# Patient Record
Sex: FEMALE | Race: WHITE
[De-identification: names, ages, dates, MRNs, and addresses within clinical notes are randomized per-mention and may not be internally consistent; named-entity substitution may affect disease eponyms.]

---

## 2017-03-21 ENCOUNTER — HOSPITAL ENCOUNTER (OUTPATIENT)
Dept: HOSPITAL 83 - MAMMO | Age: 72
Discharge: HOME | End: 2017-03-21
Attending: INTERNAL MEDICINE
Payer: MEDICARE

## 2017-03-21 DIAGNOSIS — Z85.3: ICD-10-CM

## 2017-03-21 DIAGNOSIS — D05.10: Primary | ICD-10-CM

## 2017-08-07 ENCOUNTER — HOSPITAL ENCOUNTER (OUTPATIENT)
Dept: HOSPITAL 83 - CT | Age: 72
Discharge: HOME | End: 2017-08-07
Attending: INTERNAL MEDICINE
Payer: MEDICARE

## 2017-08-07 DIAGNOSIS — M47.896: ICD-10-CM

## 2017-08-07 DIAGNOSIS — Z85.3: ICD-10-CM

## 2017-08-07 DIAGNOSIS — K44.9: Primary | ICD-10-CM

## 2017-08-07 DIAGNOSIS — Z96.642: ICD-10-CM

## 2017-08-07 DIAGNOSIS — M12.851: ICD-10-CM

## 2017-08-07 DIAGNOSIS — Z90.49: ICD-10-CM

## 2017-08-07 DIAGNOSIS — M43.16: ICD-10-CM

## 2017-09-08 ENCOUNTER — HOSPITAL ENCOUNTER (OUTPATIENT)
Dept: HOSPITAL 83 - SDC | Age: 72
Discharge: HOME | End: 2017-09-08
Attending: INTERNAL MEDICINE
Payer: MEDICARE

## 2017-09-08 VITALS — HEIGHT: 60 IN | WEIGHT: 184 LBS | BODY MASS INDEX: 36.12 KG/M2

## 2017-09-08 VITALS — DIASTOLIC BLOOD PRESSURE: 79 MMHG

## 2017-09-08 VITALS — SYSTOLIC BLOOD PRESSURE: 136 MMHG | DIASTOLIC BLOOD PRESSURE: 69 MMHG

## 2017-09-08 VITALS — SYSTOLIC BLOOD PRESSURE: 153 MMHG | DIASTOLIC BLOOD PRESSURE: 72 MMHG

## 2017-09-08 VITALS — DIASTOLIC BLOOD PRESSURE: 61 MMHG

## 2017-09-08 DIAGNOSIS — Z80.9: ICD-10-CM

## 2017-09-08 DIAGNOSIS — Z88.8: ICD-10-CM

## 2017-09-08 DIAGNOSIS — E66.9: ICD-10-CM

## 2017-09-08 DIAGNOSIS — J43.9: ICD-10-CM

## 2017-09-08 DIAGNOSIS — K44.9: ICD-10-CM

## 2017-09-08 DIAGNOSIS — I10: ICD-10-CM

## 2017-09-08 DIAGNOSIS — J45.909: ICD-10-CM

## 2017-09-08 DIAGNOSIS — F31.9: ICD-10-CM

## 2017-09-08 DIAGNOSIS — E11.9: ICD-10-CM

## 2017-09-08 DIAGNOSIS — Z88.0: ICD-10-CM

## 2017-09-08 DIAGNOSIS — K29.50: Primary | ICD-10-CM

## 2017-09-08 DIAGNOSIS — Z79.4: ICD-10-CM

## 2017-09-08 DIAGNOSIS — Z85.3: ICD-10-CM

## 2017-09-08 DIAGNOSIS — K62.1: ICD-10-CM

## 2017-09-08 DIAGNOSIS — K57.30: ICD-10-CM

## 2017-09-08 DIAGNOSIS — Z83.3: ICD-10-CM

## 2017-09-08 DIAGNOSIS — F32.9: ICD-10-CM

## 2017-09-08 DIAGNOSIS — Z90.11: ICD-10-CM

## 2017-09-08 DIAGNOSIS — Z88.2: ICD-10-CM

## 2017-11-16 ENCOUNTER — HOSPITAL ENCOUNTER (OUTPATIENT)
Dept: HOSPITAL 83 - SDC | Age: 72
Discharge: HOME | End: 2017-11-16
Attending: SURGERY
Payer: MEDICARE

## 2017-11-16 VITALS — BODY MASS INDEX: 35.14 KG/M2 | HEIGHT: 60 IN | WEIGHT: 179 LBS

## 2017-11-16 VITALS — DIASTOLIC BLOOD PRESSURE: 56 MMHG | SYSTOLIC BLOOD PRESSURE: 164 MMHG

## 2017-11-16 VITALS — DIASTOLIC BLOOD PRESSURE: 86 MMHG | SYSTOLIC BLOOD PRESSURE: 168 MMHG

## 2017-11-16 VITALS — DIASTOLIC BLOOD PRESSURE: 93 MMHG

## 2017-11-16 VITALS — DIASTOLIC BLOOD PRESSURE: 90 MMHG | SYSTOLIC BLOOD PRESSURE: 183 MMHG

## 2017-11-16 DIAGNOSIS — Z83.3: ICD-10-CM

## 2017-11-16 DIAGNOSIS — Z80.9: ICD-10-CM

## 2017-11-16 DIAGNOSIS — Z88.1: ICD-10-CM

## 2017-11-16 DIAGNOSIS — F41.9: ICD-10-CM

## 2017-11-16 DIAGNOSIS — J43.9: ICD-10-CM

## 2017-11-16 DIAGNOSIS — Z96.653: ICD-10-CM

## 2017-11-16 DIAGNOSIS — L72.0: Primary | ICD-10-CM

## 2017-11-16 DIAGNOSIS — F31.9: ICD-10-CM

## 2017-11-16 DIAGNOSIS — I10: ICD-10-CM

## 2017-11-16 DIAGNOSIS — E11.9: ICD-10-CM

## 2017-11-16 DIAGNOSIS — Z88.0: ICD-10-CM

## 2017-11-16 NOTE — PROC NOTE
[place], [place]
 
                                     PROCEDURE NOTE
 
        NAME: VIVIANE CRABTREE                    [facility] #: [id number]
        UNIT #: [id number]                        ROOM:           
        DOCTOR: CARL MORENO MD                  BIRTHDATE: [birth year]
 
 
DOS: 11/16/2017
 
PREOPERATIVE DIAGNOSIS:  Left thigh soft tissue mass.
 
POSTOPERATIVE DIAGNOSIS:  Left thigh soft tissue mass.
 
PROCEDURE:  Excision of left soft tissue mass.
 
SURGEON:  Carl Moreno MD
 
ASSISTANT:  JAZZ.
 
ANESTHESIA:  Local.
 
INDICATIONS:  This is a 72-year-old  lady with a history of
long-standing left thigh soft tissue mass, who is here for the above-mentioned
procedure.  The procedure and its complications were explained to the patient in
detail.  Complications that were discussed included but were not limited to
bleeding, infection, hematoma/seroma/abscess formation and prolonged pain.  She
agreed to proceed.
 
DESCRIPTION OF PROCEDURE:  After identifying the patient, the patient was
brought to the operating suite and laid in the supine position.  After the parts
were painted and draped in the usual sterile fashion, a timeout procedure was
called and an incision was marked.  Local anesthesia (1% plain lidocaine) was
injected in this marked site.  Skin incision was made.  The mass was identified
and was dissected away from the surrounding structures with the help of
electrocautery and blunt dissection and sent for histopathological diagnosis
after it was completely excised.  Hemostasis was achieved with the help of
electrocautery.  Thereafter, the subcutaneous tissue was approximated in
interrupted fashion with the help of 3-0 Vicryl and the skin edges were
approximated with help of 4-0 Vicryl in a subcuticular running fashion. 
Dressing was placed.  The patient tolerated the procedure well.  There were no
complications.  Dr. Carl Moreno, the attending surgeon, was present throughout
the operating case.
 
 
 
_________________________________
Carl Moreno MD
 
CM:PROCNOTE:PROCEDURE NOTE
 
D: 11/16/17 0903
T: 11/16/17 0942
    
                                                            
CARL MORENO MD

## 2018-01-25 ENCOUNTER — HOSPITAL ENCOUNTER (OUTPATIENT)
Dept: HOSPITAL 83 - WOUNDCARE | Age: 73
Discharge: HOME | End: 2018-01-25
Payer: MEDICARE

## 2018-01-25 DIAGNOSIS — Z79.4: ICD-10-CM

## 2018-01-25 DIAGNOSIS — F41.9: ICD-10-CM

## 2018-01-25 DIAGNOSIS — E11.9: ICD-10-CM

## 2018-01-25 DIAGNOSIS — T81.89XA: Primary | ICD-10-CM

## 2018-01-25 DIAGNOSIS — Z85.3: ICD-10-CM
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
## 2018-01-25 DIAGNOSIS — I10: ICD-10-CM

## 2018-01-25 DIAGNOSIS — F31.9: ICD-10-CM

## 2018-05-16 ENCOUNTER — HOSPITAL ENCOUNTER (INPATIENT)
Dept: HOSPITAL 83 - ED | Age: 73
LOS: 5 days | Discharge: NURSING, REHAB OR LONG-TERM CARE | DRG: 190 | End: 2018-05-21
Attending: INTERNAL MEDICINE | Admitting: INTERNAL MEDICINE
Payer: MEDICARE

## 2018-05-16 VITALS — BODY MASS INDEX: 41.02 KG/M2 | HEIGHT: 57 IN | WEIGHT: 190.13 LBS

## 2018-05-16 VITALS — SYSTOLIC BLOOD PRESSURE: 142 MMHG | DIASTOLIC BLOOD PRESSURE: 49 MMHG

## 2018-05-16 VITALS — DIASTOLIC BLOOD PRESSURE: 82 MMHG | SYSTOLIC BLOOD PRESSURE: 168 MMHG

## 2018-05-16 VITALS — DIASTOLIC BLOOD PRESSURE: 85 MMHG

## 2018-05-16 VITALS — SYSTOLIC BLOOD PRESSURE: 154 MMHG | DIASTOLIC BLOOD PRESSURE: 47 MMHG

## 2018-05-16 VITALS — SYSTOLIC BLOOD PRESSURE: 172 MMHG | DIASTOLIC BLOOD PRESSURE: 72 MMHG

## 2018-05-16 VITALS — SYSTOLIC BLOOD PRESSURE: 152 MMHG | DIASTOLIC BLOOD PRESSURE: 73 MMHG

## 2018-05-16 VITALS — SYSTOLIC BLOOD PRESSURE: 140 MMHG | DIASTOLIC BLOOD PRESSURE: 53 MMHG

## 2018-05-16 DIAGNOSIS — J18.1: ICD-10-CM

## 2018-05-16 DIAGNOSIS — F41.1: ICD-10-CM

## 2018-05-16 DIAGNOSIS — J44.0: Primary | ICD-10-CM

## 2018-05-16 DIAGNOSIS — J44.1: ICD-10-CM

## 2018-05-16 DIAGNOSIS — Z79.4: ICD-10-CM

## 2018-05-16 DIAGNOSIS — Z90.11: ICD-10-CM

## 2018-05-16 DIAGNOSIS — Z80.9: ICD-10-CM

## 2018-05-16 DIAGNOSIS — Z98.41: ICD-10-CM

## 2018-05-16 DIAGNOSIS — R51: ICD-10-CM

## 2018-05-16 DIAGNOSIS — Z90.49: ICD-10-CM

## 2018-05-16 DIAGNOSIS — Z88.2: ICD-10-CM

## 2018-05-16 DIAGNOSIS — Z83.3: ICD-10-CM

## 2018-05-16 DIAGNOSIS — Z91.041: ICD-10-CM

## 2018-05-16 DIAGNOSIS — E11.22: ICD-10-CM

## 2018-05-16 DIAGNOSIS — J30.9: ICD-10-CM

## 2018-05-16 DIAGNOSIS — Z79.899: ICD-10-CM

## 2018-05-16 DIAGNOSIS — Z96.1: ICD-10-CM

## 2018-05-16 DIAGNOSIS — I12.9: ICD-10-CM

## 2018-05-16 DIAGNOSIS — N18.9: ICD-10-CM

## 2018-05-16 DIAGNOSIS — M48.00: ICD-10-CM

## 2018-05-16 DIAGNOSIS — D72.829: ICD-10-CM

## 2018-05-16 DIAGNOSIS — Z88.0: ICD-10-CM

## 2018-05-16 DIAGNOSIS — F31.9: ICD-10-CM

## 2018-05-16 DIAGNOSIS — Z87.19: ICD-10-CM

## 2018-05-16 DIAGNOSIS — Z81.8: ICD-10-CM

## 2018-05-16 DIAGNOSIS — E66.9: ICD-10-CM

## 2018-05-16 DIAGNOSIS — J90: ICD-10-CM

## 2018-05-16 DIAGNOSIS — Z90.710: ICD-10-CM

## 2018-05-16 DIAGNOSIS — Z98.42: ICD-10-CM

## 2018-05-16 DIAGNOSIS — Z96.653: ICD-10-CM

## 2018-05-16 DIAGNOSIS — Z84.89: ICD-10-CM

## 2018-05-16 LAB
ALBUMIN SERPL-MCNC: 2.2 GM/DL (ref 3.1–4.5)
ALP SERPL-CCNC: 53 U/L (ref 45–117)
ALT SERPL W P-5'-P-CCNC: 11 U/L (ref 12–78)
APPEARANCE UR: CLEAR
APTT PPP: 29.2 SECONDS (ref 20.8–31.5)
AST SERPL-CCNC: 8 IU/L (ref 3–35)
BACTERIA #/AREA URNS HPF: (no result) /[HPF]
BILIRUB UR QL STRIP: NEGATIVE
BUN SERPL-MCNC: 26 MG/DL (ref 7–24)
CHLORIDE SERPL-SCNC: 102 MMOL/L (ref 98–107)
COLOR UR: YELLOW
CREAT SERPL-MCNC: 1.13 MG/DL (ref 0.55–1.02)
EPI CELLS #/AREA URNS HPF: (no result) /[HPF]
ERYTHROCYTE [DISTWIDTH] IN BLOOD BY AUTOMATED COUNT: 12.2 % (ref 0–14.5)
GLUCOSE UR QL: NEGATIVE
HCT VFR BLD AUTO: 33.4 % (ref 37–47)
HGB BLD-MCNC: 11 G/DL (ref 12–16)
HGB UR QL STRIP: (no result)
INR BLD: 1 (ref 2–3.5)
KETONES UR QL STRIP: NEGATIVE
LEUKOCYTE ESTERASE UR QL STRIP: NEGATIVE
MCH RBC QN AUTO: 31.3 PG (ref 27–31)
MCHC RBC AUTO-ENTMCNC: 32.9 G/DL (ref 33–37)
MCV RBC AUTO: 95.2 FL (ref 81–99)
NITRITE UR QL STRIP: NEGATIVE
NRBC BLD QL AUTO: 0 10*3/UL (ref 0–0)
PH UR STRIP: 6.5 [PH] (ref 5–9)
PLATELET # BLD AUTO: 149 10*3/UL (ref 130–400)
PLATELET SUFFICIENCY: NORMAL
PMV BLD AUTO: 10.1 FL (ref 9.6–12.3)
POTASSIUM SERPL-SCNC: 3.7 MMOL/L (ref 3.5–5.1)
PROT SERPL-MCNC: 6.2 GM/DL (ref 6.4–8.2)
RBC # BLD AUTO: 3.51 10*6/UL (ref 4.1–5.1)
RBC #/AREA URNS HPF: (no result) RBC/HPF (ref 0–2)
RBC MORPH BLD: NORMAL
SODIUM SERPL-SCNC: 136 MMOL/L (ref 136–145)
SP GR UR: <= 1.005 (ref 1–1.03)
TOTAL CELLS COUNTED: 100 #CELLS
TROPONIN I SERPL-MCNC: < 0.015 NG/ML (ref ?–0.04)
UROBILINOGEN UR STRIP-MCNC: 0.2 E.U./DL (ref 0.2–1)
WBC #/AREA URNS HPF: (no result) WBC/HPF (ref 0–5)
WBC NRBC COR # BLD AUTO: 19 10*3/UL (ref 4.8–10.8)

## 2018-05-17 VITALS — SYSTOLIC BLOOD PRESSURE: 136 MMHG | DIASTOLIC BLOOD PRESSURE: 62 MMHG

## 2018-05-17 VITALS — SYSTOLIC BLOOD PRESSURE: 147 MMHG | DIASTOLIC BLOOD PRESSURE: 50 MMHG

## 2018-05-17 VITALS — SYSTOLIC BLOOD PRESSURE: 152 MMHG | DIASTOLIC BLOOD PRESSURE: 59 MMHG

## 2018-05-17 VITALS — DIASTOLIC BLOOD PRESSURE: 64 MMHG | SYSTOLIC BLOOD PRESSURE: 150 MMHG

## 2018-05-17 VITALS — SYSTOLIC BLOOD PRESSURE: 128 MMHG | DIASTOLIC BLOOD PRESSURE: 64 MMHG

## 2018-05-18 VITALS — DIASTOLIC BLOOD PRESSURE: 68 MMHG

## 2018-05-18 VITALS — DIASTOLIC BLOOD PRESSURE: 64 MMHG | SYSTOLIC BLOOD PRESSURE: 150 MMHG

## 2018-05-18 VITALS — DIASTOLIC BLOOD PRESSURE: 66 MMHG | SYSTOLIC BLOOD PRESSURE: 148 MMHG

## 2018-05-18 VITALS — DIASTOLIC BLOOD PRESSURE: 64 MMHG | SYSTOLIC BLOOD PRESSURE: 152 MMHG

## 2018-05-18 VITALS — DIASTOLIC BLOOD PRESSURE: 48 MMHG | SYSTOLIC BLOOD PRESSURE: 145 MMHG

## 2018-05-18 LAB
ALBUMIN SERPL-MCNC: 1.9 GM/DL (ref 3.1–4.5)
ALP SERPL-CCNC: 48 U/L (ref 45–117)
ALT SERPL W P-5'-P-CCNC: 11 U/L (ref 12–78)
AST SERPL-CCNC: 9 IU/L (ref 3–35)
BASOPHILS # BLD AUTO: 0 10*3/UL (ref 0–0.1)
BASOPHILS NFR BLD AUTO: 0.3 % (ref 0–1)
BUN SERPL-MCNC: 18 MG/DL (ref 7–24)
CHLORIDE SERPL-SCNC: 104 MMOL/L (ref 98–107)
CREAT SERPL-MCNC: 0.82 MG/DL (ref 0.55–1.02)
EOSINOPHIL # BLD AUTO: 0 10*3/UL (ref 0–0.4)
EOSINOPHIL # BLD AUTO: 0.5 % (ref 1–4)
ERYTHROCYTE [DISTWIDTH] IN BLOOD BY AUTOMATED COUNT: 12 % (ref 0–14.5)
HCT VFR BLD AUTO: 33.5 % (ref 37–47)
HGB BLD-MCNC: 10.9 G/DL (ref 12–16)
LYMPHOCYTES # BLD AUTO: 1.3 10*3/UL (ref 1.3–4.4)
LYMPHOCYTES NFR BLD AUTO: 16.5 % (ref 27–41)
MCH RBC QN AUTO: 30.9 PG (ref 27–31)
MCHC RBC AUTO-ENTMCNC: 32.5 G/DL (ref 33–37)
MCV RBC AUTO: 94.9 FL (ref 81–99)
MONOCYTES # BLD AUTO: 1 10*3/UL (ref 0.1–1)
MONOCYTES NFR BLD MANUAL: 12.5 % (ref 3–9)
NEUT #: 5.5 10*3/UL (ref 2.3–7.9)
NEUT %: 69.4 % (ref 47–73)
NRBC BLD QL AUTO: 0 % (ref 0–0)
PLATELET # BLD AUTO: 185 10*3/UL (ref 130–400)
PMV BLD AUTO: 9.9 FL (ref 9.6–12.3)
POTASSIUM SERPL-SCNC: 3.9 MMOL/L (ref 3.5–5.1)
PROT SERPL-MCNC: 5.7 GM/DL (ref 6.4–8.2)
RBC # BLD AUTO: 3.53 10*6/UL (ref 4.1–5.1)
SODIUM SERPL-SCNC: 137 MMOL/L (ref 136–145)
WBC NRBC COR # BLD AUTO: 7.9 10*3/UL (ref 4.8–10.8)

## 2018-05-19 VITALS — DIASTOLIC BLOOD PRESSURE: 68 MMHG | SYSTOLIC BLOOD PRESSURE: 150 MMHG

## 2018-05-19 VITALS — SYSTOLIC BLOOD PRESSURE: 138 MMHG | DIASTOLIC BLOOD PRESSURE: 75 MMHG

## 2018-05-19 VITALS — DIASTOLIC BLOOD PRESSURE: 58 MMHG

## 2018-05-19 VITALS — DIASTOLIC BLOOD PRESSURE: 90 MMHG

## 2018-05-19 VITALS — DIASTOLIC BLOOD PRESSURE: 62 MMHG

## 2018-05-19 LAB
ALBUMIN SERPL-MCNC: 2 GM/DL (ref 3.1–4.5)
ALP SERPL-CCNC: 52 U/L (ref 45–117)
ALT SERPL W P-5'-P-CCNC: 10 U/L (ref 12–78)
AST SERPL-CCNC: 9 IU/L (ref 3–35)
BASOPHILS # BLD AUTO: 0 10*3/UL (ref 0–0.1)
BASOPHILS NFR BLD AUTO: 0.1 % (ref 0–1)
BUN SERPL-MCNC: 20 MG/DL (ref 7–24)
CHLORIDE SERPL-SCNC: 106 MMOL/L (ref 98–107)
CREAT SERPL-MCNC: 0.9 MG/DL (ref 0.55–1.02)
EOSINOPHIL # BLD AUTO: 0 10*3/UL (ref 0–0.4)
EOSINOPHIL # BLD AUTO: 0.4 % (ref 1–4)
ERYTHROCYTE [DISTWIDTH] IN BLOOD BY AUTOMATED COUNT: 12 % (ref 0–14.5)
HCT VFR BLD AUTO: 33.5 % (ref 37–47)
HGB BLD-MCNC: 10.8 G/DL (ref 12–16)
LYMPHOCYTES # BLD AUTO: 1.4 10*3/UL (ref 1.3–4.4)
LYMPHOCYTES NFR BLD AUTO: 17 % (ref 27–41)
MCH RBC QN AUTO: 30.8 PG (ref 27–31)
MCHC RBC AUTO-ENTMCNC: 32.2 G/DL (ref 33–37)
MCV RBC AUTO: 95.4 FL (ref 81–99)
MONOCYTES # BLD AUTO: 1 10*3/UL (ref 0.1–1)
MONOCYTES NFR BLD MANUAL: 12.2 % (ref 3–9)
NEUT #: 5.5 10*3/UL (ref 2.3–7.9)
NEUT %: 68.8 % (ref 47–73)
NRBC BLD QL AUTO: 0 10*3/UL (ref 0–0)
PLATELET # BLD AUTO: 210 10*3/UL (ref 130–400)
PMV BLD AUTO: 10.3 FL (ref 9.6–12.3)
POTASSIUM SERPL-SCNC: 3.9 MMOL/L (ref 3.5–5.1)
PROT SERPL-MCNC: 5.8 GM/DL (ref 6.4–8.2)
RBC # BLD AUTO: 3.51 10*6/UL (ref 4.1–5.1)
SODIUM SERPL-SCNC: 138 MMOL/L (ref 136–145)
WBC NRBC COR # BLD AUTO: 8 10*3/UL (ref 4.8–10.8)

## 2018-05-20 VITALS — DIASTOLIC BLOOD PRESSURE: 73 MMHG

## 2018-05-20 VITALS — SYSTOLIC BLOOD PRESSURE: 147 MMHG | DIASTOLIC BLOOD PRESSURE: 48 MMHG

## 2018-05-20 VITALS — DIASTOLIC BLOOD PRESSURE: 72 MMHG

## 2018-05-20 VITALS — SYSTOLIC BLOOD PRESSURE: 129 MMHG | DIASTOLIC BLOOD PRESSURE: 61 MMHG

## 2018-05-20 VITALS — DIASTOLIC BLOOD PRESSURE: 52 MMHG

## 2018-05-20 LAB
ALBUMIN SERPL-MCNC: 1.9 GM/DL (ref 3.1–4.5)
ALP SERPL-CCNC: 42 U/L (ref 45–117)
ALT SERPL W P-5'-P-CCNC: 11 U/L (ref 12–78)
AST SERPL-CCNC: 9 IU/L (ref 3–35)
BUN SERPL-MCNC: 21 MG/DL (ref 7–24)
CHLORIDE SERPL-SCNC: 106 MMOL/L (ref 98–107)
CREAT SERPL-MCNC: 0.79 MG/DL (ref 0.55–1.02)
ERYTHROCYTE [DISTWIDTH] IN BLOOD BY AUTOMATED COUNT: 12.2 % (ref 0–14.5)
HCT VFR BLD AUTO: 32.9 % (ref 37–47)
HGB BLD-MCNC: 10.6 G/DL (ref 12–16)
MCH RBC QN AUTO: 31 PG (ref 27–31)
MCHC RBC AUTO-ENTMCNC: 32.2 G/DL (ref 33–37)
MCV RBC AUTO: 96.2 FL (ref 81–99)
NRBC BLD QL AUTO: 0 % (ref 0–0)
PLATELET # BLD AUTO: 220 10*3/UL (ref 130–400)
PLATELET SUFFICIENCY: NORMAL
PMV BLD AUTO: 10 FL (ref 9.6–12.3)
POTASSIUM SERPL-SCNC: 3.5 MMOL/L (ref 3.5–5.1)
PROT SERPL-MCNC: 5.4 GM/DL (ref 6.4–8.2)
RBC # BLD AUTO: 3.42 10*6/UL (ref 4.1–5.1)
RBC MORPH BLD: NORMAL
SODIUM SERPL-SCNC: 139 MMOL/L (ref 136–145)
TOTAL CELLS COUNTED: 100 #CELLS
WBC NRBC COR # BLD AUTO: 7.9 10*3/UL (ref 4.8–10.8)

## 2018-05-21 VITALS — DIASTOLIC BLOOD PRESSURE: 63 MMHG

## 2018-05-21 VITALS — DIASTOLIC BLOOD PRESSURE: 74 MMHG | SYSTOLIC BLOOD PRESSURE: 150 MMHG

## 2018-05-21 VITALS — DIASTOLIC BLOOD PRESSURE: 65 MMHG

## 2018-06-20 ENCOUNTER — HOSPITAL ENCOUNTER (OUTPATIENT)
Dept: HOSPITAL 83 - CT | Age: 73
Discharge: HOME | End: 2018-06-20
Attending: INTERNAL MEDICINE
Payer: MEDICARE

## 2018-06-20 DIAGNOSIS — J84.10: ICD-10-CM

## 2018-06-20 DIAGNOSIS — J44.9: Primary | ICD-10-CM

## 2019-06-12 ENCOUNTER — HOSPITAL ENCOUNTER (OUTPATIENT)
Dept: HOSPITAL 83 - MAMMO | Age: 74
Discharge: HOME | End: 2019-06-12
Attending: INTERNAL MEDICINE
Payer: COMMERCIAL

## 2019-06-12 DIAGNOSIS — R92.8: Primary | ICD-10-CM

## 2019-06-12 DIAGNOSIS — Z85.3: ICD-10-CM

## 2019-07-02 LAB
LV EF: 25 %
LVEF MODALITY: NORMAL

## 2019-07-10 ENCOUNTER — TELEPHONE (OUTPATIENT)
Dept: CARDIOLOGY CLINIC | Age: 74
End: 2019-07-10

## 2019-08-29 RX ORDER — SUCRALFATE 1 G/1
1 TABLET ORAL 4 TIMES DAILY
COMMUNITY

## 2019-08-29 RX ORDER — ACETAMINOPHEN 325 MG/1
650 TABLET ORAL EVERY 6 HOURS PRN
COMMUNITY

## 2019-08-29 RX ORDER — DICYCLOMINE HYDROCHLORIDE 10 MG/1
10 CAPSULE ORAL DAILY
COMMUNITY

## 2019-08-29 RX ORDER — FUROSEMIDE 40 MG/1
40 TABLET ORAL SEE ADMIN INSTRUCTIONS
COMMUNITY

## 2019-08-29 RX ORDER — POTASSIUM CHLORIDE 1.5 G/1.58G
20 POWDER, FOR SOLUTION ORAL 3 TIMES DAILY
COMMUNITY
End: 2019-09-03

## 2019-08-29 RX ORDER — MONTELUKAST SODIUM 10 MG/1
10 TABLET ORAL NIGHTLY
COMMUNITY

## 2019-09-03 ENCOUNTER — OFFICE VISIT (OUTPATIENT)
Dept: CARDIOLOGY CLINIC | Age: 74
End: 2019-09-03
Payer: COMMERCIAL

## 2019-09-03 VITALS
DIASTOLIC BLOOD PRESSURE: 71 MMHG | HEART RATE: 64 BPM | WEIGHT: 183 LBS | HEIGHT: 57 IN | SYSTOLIC BLOOD PRESSURE: 128 MMHG | BODY MASS INDEX: 39.48 KG/M2 | RESPIRATION RATE: 18 BRPM

## 2019-09-03 DIAGNOSIS — I42.9 CARDIOMYOPATHY, UNSPECIFIED TYPE (HCC): ICD-10-CM

## 2019-09-03 DIAGNOSIS — I10 ESSENTIAL HYPERTENSION: Primary | ICD-10-CM

## 2019-09-03 PROCEDURE — 3017F COLORECTAL CA SCREEN DOC REV: CPT | Performed by: INTERNAL MEDICINE

## 2019-09-03 PROCEDURE — 93000 ELECTROCARDIOGRAM COMPLETE: CPT | Performed by: INTERNAL MEDICINE

## 2019-09-03 PROCEDURE — 1123F ACP DISCUSS/DSCN MKR DOCD: CPT | Performed by: INTERNAL MEDICINE

## 2019-09-03 PROCEDURE — 1036F TOBACCO NON-USER: CPT | Performed by: INTERNAL MEDICINE

## 2019-09-03 PROCEDURE — G8428 CUR MEDS NOT DOCUMENT: HCPCS | Performed by: INTERNAL MEDICINE

## 2019-09-03 PROCEDURE — 4040F PNEUMOC VAC/ADMIN/RCVD: CPT | Performed by: INTERNAL MEDICINE

## 2019-09-03 PROCEDURE — 1090F PRES/ABSN URINE INCON ASSESS: CPT | Performed by: INTERNAL MEDICINE

## 2019-09-03 PROCEDURE — G8400 PT W/DXA NO RESULTS DOC: HCPCS | Performed by: INTERNAL MEDICINE

## 2019-09-03 PROCEDURE — 99214 OFFICE O/P EST MOD 30 MIN: CPT | Performed by: INTERNAL MEDICINE

## 2019-09-03 PROCEDURE — G8417 CALC BMI ABV UP PARAM F/U: HCPCS | Performed by: INTERNAL MEDICINE

## 2019-09-03 RX ORDER — DILTIAZEM HYDROCHLORIDE 180 MG/1
180 CAPSULE, COATED, EXTENDED RELEASE ORAL DAILY
COMMUNITY
End: 2019-09-03

## 2019-09-03 RX ORDER — DIVALPROEX SODIUM 500 MG/1
500 TABLET, DELAYED RELEASE ORAL 3 TIMES DAILY
COMMUNITY

## 2019-09-03 RX ORDER — DILTIAZEM HYDROCHLORIDE 180 MG/1
180 CAPSULE, EXTENDED RELEASE ORAL DAILY
COMMUNITY
End: 2019-09-03

## 2019-09-03 RX ORDER — ZIPRASIDONE HYDROCHLORIDE 20 MG/1
20 CAPSULE ORAL NIGHTLY
COMMUNITY

## 2019-09-03 RX ORDER — LOSARTAN POTASSIUM 100 MG/1
100 TABLET ORAL DAILY
Qty: 90 TABLET | Refills: 3
Start: 2019-09-03

## 2019-09-03 RX ORDER — BIOTIN 1 MG
1 TABLET ORAL
COMMUNITY

## 2019-09-03 RX ORDER — METOPROLOL SUCCINATE 50 MG/1
50 TABLET, EXTENDED RELEASE ORAL DAILY
Qty: 90 TABLET | Refills: 3
Start: 2019-09-03

## 2019-09-03 RX ORDER — SPIRONOLACTONE 25 MG/1
25 TABLET ORAL DAILY
Qty: 90 TABLET | Refills: 3 | Status: SHIPPED | OUTPATIENT
Start: 2019-09-03

## 2019-09-03 RX ORDER — LAMOTRIGINE 100 MG/1
100 TABLET ORAL NIGHTLY PRN
COMMUNITY

## 2019-09-03 SDOH — HEALTH STABILITY: MENTAL HEALTH: HOW OFTEN DO YOU HAVE A DRINK CONTAINING ALCOHOL?: NEVER

## 2019-09-03 NOTE — PROGRESS NOTES
PLAN:  Patient Active Problem List   Diagnosis    Cardiomyopathy ([facility] [address]     1. CMP: Stress normal perfusion. LVEF on stress and echo severely impaired. BB/ARB/aldactone/lasix recommended and started as inpatient. Stephon Ochoa D.O.   Cardiologist  Cardiology, [address] [facility]

## 2019-11-11 PROBLEM — N17.9 ACUTE RENAL FAILURE SYNDROME (HCC): Status: ACTIVE | Noted: 2019-11-11

## 2019-11-11 PROBLEM — N18.30 CHRONIC RENAL INSUFFICIENCY, STAGE III (MODERATE) (HCC): Status: ACTIVE | Noted: 2019-11-11

## 2019-11-11 PROBLEM — E87.1 HYPONATREMIA: Status: ACTIVE | Noted: 2019-11-11

## 2019-11-11 PROBLEM — I10 ESSENTIAL HYPERTENSION: Status: ACTIVE | Noted: 2019-11-11

## 2019-11-12 ENCOUNTER — OFFICE VISIT (OUTPATIENT)
Dept: CARDIOLOGY CLINIC | Age: 74
End: 2019-11-12
Payer: COMMERCIAL

## 2019-11-12 VITALS
BODY MASS INDEX: 39.48 KG/M2 | RESPIRATION RATE: 18 BRPM | HEIGHT: 57 IN | WEIGHT: 183 LBS | SYSTOLIC BLOOD PRESSURE: 126 MMHG | DIASTOLIC BLOOD PRESSURE: 68 MMHG | HEART RATE: 67 BPM

## 2019-11-12 DIAGNOSIS — I10 ESSENTIAL HYPERTENSION: Primary | ICD-10-CM

## 2019-11-12 PROCEDURE — 99214 OFFICE O/P EST MOD 30 MIN: CPT | Performed by: INTERNAL MEDICINE

## 2019-11-12 PROCEDURE — G8400 PT W/DXA NO RESULTS DOC: HCPCS | Performed by: INTERNAL MEDICINE

## 2019-11-12 PROCEDURE — 1090F PRES/ABSN URINE INCON ASSESS: CPT | Performed by: INTERNAL MEDICINE

## 2019-11-12 PROCEDURE — 1036F TOBACCO NON-USER: CPT | Performed by: INTERNAL MEDICINE

## 2019-11-12 PROCEDURE — G8427 DOCREV CUR MEDS BY ELIG CLIN: HCPCS | Performed by: INTERNAL MEDICINE

## 2019-11-12 PROCEDURE — G8484 FLU IMMUNIZE NO ADMIN: HCPCS | Performed by: INTERNAL MEDICINE

## 2019-11-12 PROCEDURE — 1123F ACP DISCUSS/DSCN MKR DOCD: CPT | Performed by: INTERNAL MEDICINE

## 2019-11-12 PROCEDURE — 3017F COLORECTAL CA SCREEN DOC REV: CPT | Performed by: INTERNAL MEDICINE

## 2019-11-12 PROCEDURE — G8417 CALC BMI ABV UP PARAM F/U: HCPCS | Performed by: INTERNAL MEDICINE

## 2019-11-12 PROCEDURE — 93000 ELECTROCARDIOGRAM COMPLETE: CPT | Performed by: INTERNAL MEDICINE

## 2019-11-12 PROCEDURE — 4040F PNEUMOC VAC/ADMIN/RCVD: CPT | Performed by: INTERNAL MEDICINE

## 2019-12-20 ENCOUNTER — TELEPHONE (OUTPATIENT)
Dept: CARDIOLOGY CLINIC | Age: 74
End: 2019-12-20

## 2020-06-02 ENCOUNTER — HOSPITAL ENCOUNTER (INPATIENT)
Dept: HOSPITAL 83 - ED | Age: 75
LOS: 4 days | Discharge: OTHER INSTITUTION | DRG: 291 | End: 2020-06-06
Attending: INTERNAL MEDICINE | Admitting: INTERNAL MEDICINE
Payer: COMMERCIAL

## 2020-06-02 VITALS — DIASTOLIC BLOOD PRESSURE: 96 MMHG

## 2020-06-02 VITALS — HEIGHT: 57 IN | BODY MASS INDEX: 43.85 KG/M2 | WEIGHT: 203.25 LBS

## 2020-06-02 VITALS — DIASTOLIC BLOOD PRESSURE: 56 MMHG

## 2020-06-02 VITALS — DIASTOLIC BLOOD PRESSURE: 52 MMHG

## 2020-06-02 VITALS — DIASTOLIC BLOOD PRESSURE: 78 MMHG

## 2020-06-02 VITALS — DIASTOLIC BLOOD PRESSURE: 65 MMHG

## 2020-06-02 DIAGNOSIS — I42.9: ICD-10-CM

## 2020-06-02 DIAGNOSIS — J18.9: ICD-10-CM

## 2020-06-02 DIAGNOSIS — Z88.8: ICD-10-CM

## 2020-06-02 DIAGNOSIS — Z79.4: ICD-10-CM

## 2020-06-02 DIAGNOSIS — E11.9: ICD-10-CM

## 2020-06-02 DIAGNOSIS — Z90.10: ICD-10-CM

## 2020-06-02 DIAGNOSIS — J44.1: ICD-10-CM

## 2020-06-02 DIAGNOSIS — Z88.2: ICD-10-CM

## 2020-06-02 DIAGNOSIS — F41.1: ICD-10-CM

## 2020-06-02 DIAGNOSIS — K21.0: ICD-10-CM

## 2020-06-02 DIAGNOSIS — B35.1: ICD-10-CM

## 2020-06-02 DIAGNOSIS — E43: ICD-10-CM

## 2020-06-02 DIAGNOSIS — F33.0: ICD-10-CM

## 2020-06-02 DIAGNOSIS — Z91.041: ICD-10-CM

## 2020-06-02 DIAGNOSIS — Z90.710: ICD-10-CM

## 2020-06-02 DIAGNOSIS — R07.89: ICD-10-CM

## 2020-06-02 DIAGNOSIS — Z20.828: ICD-10-CM

## 2020-06-02 DIAGNOSIS — Z98.42: ICD-10-CM

## 2020-06-02 DIAGNOSIS — I11.0: Primary | ICD-10-CM

## 2020-06-02 DIAGNOSIS — R62.7: ICD-10-CM

## 2020-06-02 DIAGNOSIS — Z79.899: ICD-10-CM

## 2020-06-02 DIAGNOSIS — Z98.41: ICD-10-CM

## 2020-06-02 DIAGNOSIS — Z83.3: ICD-10-CM

## 2020-06-02 DIAGNOSIS — I50.23: ICD-10-CM

## 2020-06-02 DIAGNOSIS — Z88.0: ICD-10-CM

## 2020-06-02 LAB
ALBUMIN SERPL-MCNC: 2.4 GM/DL (ref 3.1–4.5)
ALP SERPL-CCNC: 62 U/L (ref 45–117)
ALT SERPL W P-5'-P-CCNC: 16 U/L (ref 12–78)
APTT PPP: 26.8 SECONDS (ref 20–32.1)
AST SERPL-CCNC: 9 IU/L (ref 3–35)
BASOPHILS # BLD AUTO: 0 10*3/UL (ref 0–0.1)
BASOPHILS NFR BLD AUTO: 0.3 % (ref 0–1)
BUN SERPL-MCNC: 34 MG/DL (ref 7–24)
CHLORIDE SERPL-SCNC: 108 MMOL/L (ref 98–107)
CREAT SERPL-MCNC: 1.07 MG/DL (ref 0.55–1.02)
EOSINOPHIL # BLD AUTO: 0 10*3/UL (ref 0–0.4)
EOSINOPHIL # BLD AUTO: 0.5 % (ref 1–4)
ERYTHROCYTE [DISTWIDTH] IN BLOOD BY AUTOMATED COUNT: 12.5 % (ref 0–14.5)
HCT VFR BLD AUTO: 42.1 % (ref 37–47)
INR BLD: 0.9 (ref 2–3.5)
LYMPHOCYTES # BLD AUTO: 1.9 10*3/UL (ref 1.3–4.4)
LYMPHOCYTES NFR BLD AUTO: 25.7 % (ref 27–41)
MCH RBC QN AUTO: 31.2 PG (ref 27–31)
MCHC RBC AUTO-ENTMCNC: 31.8 G/DL (ref 33–37)
MCV RBC AUTO: 98.1 FL (ref 81–99)
MONOCYTES # BLD AUTO: 1.1 10*3/UL (ref 0.1–1)
MONOCYTES NFR BLD MANUAL: 14.6 % (ref 3–9)
NEUT #: 4.3 10*3/UL (ref 2.3–7.9)
NEUT %: 58.5 % (ref 47–73)
NRBC BLD QL AUTO: 0 % (ref 0–0)
PLATELET # BLD AUTO: 173 10*3/UL (ref 130–400)
PMV BLD AUTO: 10.1 FL (ref 9.6–12.3)
POTASSIUM SERPL-SCNC: 3.7 MMOL/L (ref 3.5–5.1)
PROT SERPL-MCNC: 6.8 GM/DL (ref 6.4–8.2)
RBC # BLD AUTO: 4.29 10*6/UL (ref 4.1–5.1)
SODIUM SERPL-SCNC: 140 MMOL/L (ref 136–145)
TROPONIN I SERPL-MCNC: < 0.015 NG/ML (ref ?–0.04)
WBC NRBC COR # BLD AUTO: 7.3 10*3/UL (ref 4.8–10.8)

## 2020-06-02 NOTE — NUR
PATIENT RESTING ON BACK, HOB ELEVATED, RESPS EASY AND REG ON ROOM AIR.
ASSESSMENT COMPLETE AT THIS TIME, PT EATING SNACK AND DENIES ANY NEEDS. CALL
LIGHT IN REACH.

## 2020-06-02 NOTE — NUR
PATIENT SITTING AT SIDE OF BED, NO NEEDS MADE. PURSED LIP BREATHING ON 4L NC,
NO DISTRESS NOTED. ASSESSMENT COMPLETE. WILL CONT TO MONIOR, CALL LIGHT IN
REACH.

## 2020-06-02 NOTE — NUR
A 75, admitted to , under the
services of Dr. KHADRA MARIANO,DARCI SHIELDS with a diagnosis of CHF, CHEST PAIN .
Chief complaint is SOB.
Patient arrived via stretcher from ER.
Monitor applied. Initial assessment completed.
Vital signs taken and recorded.
DR. KHADRA MARIANO,DARCI SHIELDS notified of admission to the unit.
Orders received.
See assessment for past medical history, medications
and allergies.
Patient and/or family oriented to unit. [facility]
visitation policy reviewed.
Clothing/patient valuable form completed.
 
CASSIE WILDE

## 2020-06-03 VITALS — DIASTOLIC BLOOD PRESSURE: 63 MMHG | SYSTOLIC BLOOD PRESSURE: 140 MMHG

## 2020-06-03 VITALS — DIASTOLIC BLOOD PRESSURE: 50 MMHG

## 2020-06-03 VITALS — DIASTOLIC BLOOD PRESSURE: 60 MMHG

## 2020-06-03 VITALS — DIASTOLIC BLOOD PRESSURE: 52 MMHG | SYSTOLIC BLOOD PRESSURE: 143 MMHG

## 2020-06-03 VITALS — DIASTOLIC BLOOD PRESSURE: 40 MMHG

## 2020-06-03 LAB
ALBUMIN SERPL-MCNC: 2.2 GM/DL (ref 3.1–4.5)
ALP SERPL-CCNC: 57 U/L (ref 45–117)
ALT SERPL W P-5'-P-CCNC: 15 U/L (ref 12–78)
AST SERPL-CCNC: 13 IU/L (ref 3–35)
BUN SERPL-MCNC: 37 MG/DL (ref 7–24)
CHLORIDE SERPL-SCNC: 105 MMOL/L (ref 98–107)
CREAT SERPL-MCNC: 1.14 MG/DL (ref 0.55–1.02)
POTASSIUM SERPL-SCNC: 3.7 MMOL/L (ref 3.5–5.1)
PROT SERPL-MCNC: 6.3 GM/DL (ref 6.4–8.2)
SODIUM SERPL-SCNC: 139 MMOL/L (ref 136–145)

## 2020-06-03 PROCEDURE — 3E073KZ INTRODUCTION OF OTHER DIAGNOSTIC SUBSTANCE INTO CORONARY ARTERY, PERCUTANEOUS APPROACH: ICD-10-PCS | Performed by: INTERNAL MEDICINE

## 2020-06-03 PROCEDURE — 4A02XM4 MEASUREMENT OF CARDIAC TOTAL ACTIVITY, EXTERNAL APPROACH: ICD-10-PCS | Performed by: INTERNAL MEDICINE

## 2020-06-03 NOTE — NUR
PHYSICAL THERAPY
 
Nursing screen received and chart reviewed. Patient admitted from [facility] with
chest pain. Recommend skilled PT evaluation if decline in functional mobility
presents. Thank you. Muna Simpson,PT,DPT

## 2020-06-03 NOTE — NUR
PATIENT SITTING UP IN BEDSIDE CHAIR. NO DISTRESS NOTED. RESPIRATIONS EASY,
REGULAR ON RA. WILL CONTINUE TO MONITOR. CALL LIGHT WITHIN REACH.

## 2020-06-03 NOTE — NUR
INFORMED CONSENT SIGNED FOR LEXISCAN STRESS TEST WITH DR. GAVIRIA.  RESTING EKG
LBBB, HR 87, /82.  PULSE OX 96% AND BREATH SOUNDS CLEAR BUT DEMINISHED
BILATERALLY.  COMPLETED ONE MINUTE OF LEXISCAN PROTOCOL RECEIVNG LEXISCAN
0.4MG OVER 10 SECONDS.  NO ARRHYTHMIAS NOTED.  NONDIAGNOSTIC ST CHANGES
PRESENT.  PT C/O ODD FEELING AND HEADACHE.  LAST RECOVERY HR 93, /64.
WAITING NUCLEAR SCANNING IN STABLE CONDITION.

## 2020-06-03 NOTE — NUR
Nursing screen received and chart reviewed. Patient admitted from [facility] for
chest pain. If patient has a decline in ADLs, transfers, or mobility, please
send OT orders. Thank you.
 
Armida Schwab, OTR/L

## 2020-06-03 NOTE — NUR
in to see patient. She is a LTC resident at [facility] and plans to
return there upon discharge. She states she gets around in a wheelchair. She
said the nurses do walk her in the hallway. When medically stable she will be
discharged to [facility].  following.

## 2020-06-03 NOTE — NUR
RESTING IN BED WITH NO EYES CLOSED AND NO DISTRESS NOTED. RESPIRATIONS EASY.
LUNGS DIMINISHED WITH PB RALES. PULSE OX 96% RA. +2 BLE EDEMA, TUBI- IN
PLACE. CALL LIGHT WITHIN REACH. NO VOICED COMPLAINTS. BED ALARM IN PLACE FOR
SAFETY

## 2020-06-04 VITALS — DIASTOLIC BLOOD PRESSURE: 57 MMHG | SYSTOLIC BLOOD PRESSURE: 128 MMHG

## 2020-06-04 VITALS — DIASTOLIC BLOOD PRESSURE: 58 MMHG

## 2020-06-04 VITALS — SYSTOLIC BLOOD PRESSURE: 156 MMHG | DIASTOLIC BLOOD PRESSURE: 90 MMHG

## 2020-06-04 VITALS — DIASTOLIC BLOOD PRESSURE: 52 MMHG | SYSTOLIC BLOOD PRESSURE: 138 MMHG

## 2020-06-04 VITALS — SYSTOLIC BLOOD PRESSURE: 142 MMHG | DIASTOLIC BLOOD PRESSURE: 68 MMHG

## 2020-06-04 PROCEDURE — 0HBRXZZ EXCISION OF TOE NAIL, EXTERNAL APPROACH: ICD-10-PCS | Performed by: PODIATRIST

## 2020-06-04 NOTE — NUR
in to see patient. No new needs or request at this time. When
medically stable she will be discharged to [facility] where she is a LTC resident.
 following.

## 2020-06-04 NOTE — NUR
RESTING IN BED WITH NO ACUTE DISTRESS NOTED. RESPIRATIONS EASY. LUNGS
DIMINISHED WITH FINE PB RALES. PULSE OX 94% RA. +3 BLE EDEMA NOTED WITH
TUBI- IN PLACE. CALL LIGHT WITHIN REACH. NO VOICED COMPLAINTS.

## 2020-06-04 NOTE — NUR
PT SITTING UP IN BED. RESPIRATIONS EASY, REGULAR ON RA. BREAKFAST TRAY SETUP.
PT ACCEPTED PO MEDS WHOLE IN APPLESAUCE. BILATERAL TUBI-. WILL CONTINUE
TO MONITOR. VSS. CALL LIGHT WITHIN REACH.

## 2020-06-05 VITALS — DIASTOLIC BLOOD PRESSURE: 60 MMHG

## 2020-06-05 VITALS — SYSTOLIC BLOOD PRESSURE: 120 MMHG | DIASTOLIC BLOOD PRESSURE: 56 MMHG

## 2020-06-05 VITALS — SYSTOLIC BLOOD PRESSURE: 115 MMHG | DIASTOLIC BLOOD PRESSURE: 51 MMHG

## 2020-06-05 VITALS — DIASTOLIC BLOOD PRESSURE: 90 MMHG

## 2020-06-05 VITALS — DIASTOLIC BLOOD PRESSURE: 62 MMHG | SYSTOLIC BLOOD PRESSURE: 136 MMHG

## 2020-06-05 VITALS — DIASTOLIC BLOOD PRESSURE: 58 MMHG | SYSTOLIC BLOOD PRESSURE: 118 MMHG

## 2020-06-05 VITALS — DIASTOLIC BLOOD PRESSURE: 64 MMHG

## 2020-06-05 NOTE — NUR
assisted to bedside chair and medicated with tylenol per prn order for
complaints of chest discomfort. hr 108. bp 120/60. pulse ox 97% ra. call
light within reach. will monitor

## 2020-06-05 NOTE — NUR
TOILETTED AND RETURNED TO BED. POSITIONED FOR COMFORT. STATES EARLIER CHEST
DISCOMFORT WAS "INDIGESTION" AND SHE "BURPED A LOT." CALL LIGHT WITHIN REACH.
NO FURTHER VOICED COMPLAINTS

## 2020-06-05 NOTE — NUR
in to see patient. No new needs or request at this time. When
medically stable she will be discharged to [facility] where she is a LTC resident.
 following.

## 2020-06-05 NOTE — NUR
AGAIN ASSISTED TO BSC TO VOID. RETURNED TO CHAIR PER REQUEST. CALL LIGHT
WITHIN REACH. NO VOICED COMPLAINTS.

## 2020-06-06 VITALS — DIASTOLIC BLOOD PRESSURE: 78 MMHG

## 2020-06-06 VITALS — DIASTOLIC BLOOD PRESSURE: 66 MMHG | SYSTOLIC BLOOD PRESSURE: 114 MMHG

## 2020-06-06 NOTE — NUR
spoke to the nurse at [facility], patient okay to come back today as long as
rapid covid test is negative. will have covid test done and then set up for
ambulance transportation.

## 2020-06-06 NOTE — NUR
Discharge instructions reviewed with patient. Patient receptive and
verbalizes understanding. Follow-up care understood.
Written instructions given to patient, [facility] with
patient. report given to [facility] nurse.
 
DENA RAY

## 2020-06-06 NOTE — NUR
ATTEMPTED TO CALL REPORT AT [facility], NO ANSWER. WILL ATTEMPT TO
CALL AGAIN PRIOR TO PATIENT TRANSFER

## 2020-11-07 ENCOUNTER — HOSPITAL ENCOUNTER (EMERGENCY)
Dept: HOSPITAL 83 - ED | Age: 75
Discharge: OTHER INSTITUTION | End: 2020-11-07
Payer: COMMERCIAL

## 2020-11-07 VITALS — HEIGHT: 65.98 IN

## 2020-11-07 VITALS — DIASTOLIC BLOOD PRESSURE: 58 MMHG

## 2020-11-07 DIAGNOSIS — Z79.899: ICD-10-CM
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
## 2020-11-07 DIAGNOSIS — Z88.8: ICD-10-CM

## 2020-11-07 DIAGNOSIS — Z91.041: ICD-10-CM
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
## 2020-11-07 DIAGNOSIS — S00.93XA: Primary | ICD-10-CM
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
## 2020-11-07 DIAGNOSIS — Z88.2: ICD-10-CM
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
## 2020-11-07 DIAGNOSIS — Z88.0: ICD-10-CM

## 2021-02-05 ENCOUNTER — HOSPITAL ENCOUNTER (INPATIENT)
Dept: HOSPITAL 83 - ED | Age: 76
LOS: 5 days | Discharge: NURSING, REHAB OR LONG-TERM CARE | DRG: 291 | End: 2021-02-10
Attending: INTERNAL MEDICINE | Admitting: INTERNAL MEDICINE
Payer: COMMERCIAL

## 2021-02-05 VITALS — WEIGHT: 217.25 LBS | BODY MASS INDEX: 46.87 KG/M2 | HEIGHT: 56.97 IN

## 2021-02-05 VITALS — DIASTOLIC BLOOD PRESSURE: 55 MMHG

## 2021-02-05 VITALS — SYSTOLIC BLOOD PRESSURE: 132 MMHG | DIASTOLIC BLOOD PRESSURE: 45 MMHG

## 2021-02-05 VITALS — DIASTOLIC BLOOD PRESSURE: 70 MMHG

## 2021-02-05 VITALS — DIASTOLIC BLOOD PRESSURE: 90 MMHG | SYSTOLIC BLOOD PRESSURE: 130 MMHG

## 2021-02-05 VITALS — DIASTOLIC BLOOD PRESSURE: 74 MMHG

## 2021-02-05 VITALS — DIASTOLIC BLOOD PRESSURE: 50 MMHG

## 2021-02-05 DIAGNOSIS — Z79.51: ICD-10-CM

## 2021-02-05 DIAGNOSIS — Z88.2: ICD-10-CM

## 2021-02-05 DIAGNOSIS — J44.9: ICD-10-CM

## 2021-02-05 DIAGNOSIS — F31.9: ICD-10-CM

## 2021-02-05 DIAGNOSIS — Z91.041: ICD-10-CM

## 2021-02-05 DIAGNOSIS — Z79.899: ICD-10-CM

## 2021-02-05 DIAGNOSIS — E11.9: ICD-10-CM

## 2021-02-05 DIAGNOSIS — I11.0: Primary | ICD-10-CM

## 2021-02-05 DIAGNOSIS — J44.0: ICD-10-CM

## 2021-02-05 DIAGNOSIS — I50.31: ICD-10-CM

## 2021-02-05 DIAGNOSIS — Z20.822: ICD-10-CM

## 2021-02-05 DIAGNOSIS — Z88.8: ICD-10-CM

## 2021-02-05 DIAGNOSIS — Z79.4: ICD-10-CM

## 2021-02-05 DIAGNOSIS — Z83.3: ICD-10-CM

## 2021-02-05 DIAGNOSIS — I43: ICD-10-CM

## 2021-02-05 DIAGNOSIS — Z88.0: ICD-10-CM

## 2021-02-05 DIAGNOSIS — E87.2: ICD-10-CM

## 2021-02-05 DIAGNOSIS — F41.1: ICD-10-CM

## 2021-02-05 DIAGNOSIS — J96.01: ICD-10-CM

## 2021-02-05 LAB
ALBUMIN SERPL-MCNC: 2.4 GM/DL (ref 3.1–4.5)
ALP SERPL-CCNC: 67 U/L (ref 45–117)
ALT SERPL W P-5'-P-CCNC: 15 U/L (ref 12–78)
APTT PPP: 24.8 SECONDS (ref 20–32.1)
AST SERPL-CCNC: 14 IU/L (ref 3–35)
BACTERIA #/AREA URNS HPF: (no result) /[HPF]
BASE EXCESS BLDA CALC-SCNC: -0.8 MMOL/L (ref -2–2)
BASOPHILS # BLD AUTO: 0 10*3/UL (ref 0–0.1)
BASOPHILS NFR BLD AUTO: 0.3 % (ref 0–1)
BUN SERPL-MCNC: 21 MG/DL (ref 7–24)
CHLORIDE SERPL-SCNC: 106 MMOL/L (ref 98–107)
CREAT SERPL-MCNC: 1.14 MG/DL (ref 0.55–1.02)
EOSINOPHIL # BLD AUTO: 0 % (ref 1–4)
EOSINOPHIL # BLD AUTO: 0 10*3/UL (ref 0–0.4)
EPI CELLS #/AREA URNS HPF: (no result) /[HPF]
ERYTHROCYTE [DISTWIDTH] IN BLOOD BY AUTOMATED COUNT: 13 % (ref 0–14.5)
HCT VFR BLD AUTO: 39.7 % (ref 37–47)
INR BLD: 1 (ref 2–3.5)
LYMPHOCYTES # BLD AUTO: 0.4 10*3/UL (ref 1.3–4.4)
LYMPHOCYTES NFR BLD AUTO: 5.5 % (ref 27–41)
MCH RBC QN AUTO: 30.3 PG (ref 27–31)
MCHC RBC AUTO-ENTMCNC: 31 G/DL (ref 33–37)
MCV RBC AUTO: 97.8 FL (ref 81–99)
MONOCYTES # BLD AUTO: 0.8 10*3/UL (ref 0.1–1)
MONOCYTES NFR BLD MANUAL: 10.1 % (ref 3–9)
NEUT #: 6.6 10*3/UL (ref 2.3–7.9)
NEUT %: 83.3 % (ref 47–73)
NRBC BLD QL AUTO: 0 10*3/UL (ref 0–0)
PCO2 BLDA: 43 MMHG (ref 35–45)
PH BLDA: 7.37 [PH] (ref 7.35–7.45)
PH UR STRIP: 6.5 [PH] (ref 4.5–8)
PLATELET # BLD AUTO: 141 10*3/UL (ref 130–400)
PMV BLD AUTO: 10.1 FL (ref 9.6–12.3)
PO2 BLDA: 159 MMHG (ref 80–90)
POTASSIUM SERPL-SCNC: 5 MMOL/L (ref 3.5–5.1)
PROT SERPL-MCNC: 6.6 GM/DL (ref 6.4–8.2)
RBC # BLD AUTO: 4.06 10*6/UL (ref 4.1–5.1)
RBC #/AREA URNS HPF: (no result) RBC/HPF (ref 0–2)
SAO2 % BLDA: 97 % (ref 95–97)
SODIUM SERPL-SCNC: 137 MMOL/L (ref 136–145)
SP GR UR: 1.02 (ref 1–1.03)
TROPONIN I SERPL-MCNC: < 0.015 NG/ML (ref ?–0.04)
UROBILINOGEN UR STRIP-MCNC: 0.2 E.U./DL (ref 0–1)
WBC #/AREA URNS HPF: (no result) WBC/HPF (ref 0–5)
WBC NRBC COR # BLD AUTO: 7.9 10*3/UL (ref 4.8–10.8)

## 2021-02-06 VITALS — SYSTOLIC BLOOD PRESSURE: 123 MMHG | DIASTOLIC BLOOD PRESSURE: 46 MMHG

## 2021-02-06 VITALS — DIASTOLIC BLOOD PRESSURE: 49 MMHG

## 2021-02-06 VITALS — DIASTOLIC BLOOD PRESSURE: 50 MMHG | SYSTOLIC BLOOD PRESSURE: 136 MMHG

## 2021-02-06 VITALS — SYSTOLIC BLOOD PRESSURE: 114 MMHG | DIASTOLIC BLOOD PRESSURE: 67 MMHG

## 2021-02-06 VITALS — DIASTOLIC BLOOD PRESSURE: 41 MMHG

## 2021-02-06 LAB
EPI CELLS #/AREA URNS HPF: (no result) /[HPF]
PH UR STRIP: 5.5 [PH] (ref 4.5–8)
RBC #/AREA URNS HPF: (no result) RBC/HPF (ref 0–2)
SP GR UR: 1.01 (ref 1–1.03)
UROBILINOGEN UR STRIP-MCNC: 0.2 E.U./DL (ref 0–1)
WBC #/AREA URNS HPF: (no result) WBC/HPF (ref 0–5)

## 2021-02-07 VITALS — DIASTOLIC BLOOD PRESSURE: 57 MMHG

## 2021-02-07 VITALS — DIASTOLIC BLOOD PRESSURE: 66 MMHG

## 2021-02-07 VITALS — DIASTOLIC BLOOD PRESSURE: 52 MMHG

## 2021-02-07 VITALS — SYSTOLIC BLOOD PRESSURE: 145 MMHG | DIASTOLIC BLOOD PRESSURE: 51 MMHG

## 2021-02-07 VITALS — DIASTOLIC BLOOD PRESSURE: 50 MMHG | SYSTOLIC BLOOD PRESSURE: 124 MMHG

## 2021-02-07 VITALS — DIASTOLIC BLOOD PRESSURE: 70 MMHG

## 2021-02-07 LAB
ALBUMIN SERPL-MCNC: 2 GM/DL (ref 3.1–4.5)
ALP SERPL-CCNC: 49 U/L (ref 45–117)
ALT SERPL W P-5'-P-CCNC: 15 U/L (ref 12–78)
AST SERPL-CCNC: 14 IU/L (ref 3–35)
BUN SERPL-MCNC: 25 MG/DL (ref 7–24)
CHLORIDE SERPL-SCNC: 105 MMOL/L (ref 98–107)
CREAT SERPL-MCNC: 1.06 MG/DL (ref 0.55–1.02)
ERYTHROCYTE [DISTWIDTH] IN BLOOD BY AUTOMATED COUNT: 12.6 % (ref 0–14.5)
HCT VFR BLD AUTO: 36.3 % (ref 37–47)
MCH RBC QN AUTO: 30.1 PG (ref 27–31)
MCHC RBC AUTO-ENTMCNC: 30.6 G/DL (ref 33–37)
MCV RBC AUTO: 98.4 FL (ref 81–99)
NRBC BLD QL AUTO: 0 % (ref 0–0)
PLATELET # BLD AUTO: 120 10*3/UL (ref 130–400)
PLATELET SUFFICIENCY: (no result)
PMV BLD AUTO: 10.2 FL (ref 9.6–12.3)
POTASSIUM SERPL-SCNC: 4.4 MMOL/L (ref 3.5–5.1)
PROT SERPL-MCNC: 5.8 GM/DL (ref 6.4–8.2)
RBC # BLD AUTO: 3.69 10*6/UL (ref 4.1–5.1)
RBC MORPH BLD: NORMAL
SODIUM SERPL-SCNC: 139 MMOL/L (ref 136–145)
TOTAL CELLS COUNTED: 100 #CELLS
WBC NRBC COR # BLD AUTO: 4.9 10*3/UL (ref 4.8–10.8)

## 2021-02-08 VITALS — SYSTOLIC BLOOD PRESSURE: 145 MMHG | DIASTOLIC BLOOD PRESSURE: 57 MMHG

## 2021-02-08 VITALS — SYSTOLIC BLOOD PRESSURE: 155 MMHG | DIASTOLIC BLOOD PRESSURE: 64 MMHG

## 2021-02-08 VITALS — DIASTOLIC BLOOD PRESSURE: 62 MMHG | SYSTOLIC BLOOD PRESSURE: 152 MMHG

## 2021-02-09 VITALS — SYSTOLIC BLOOD PRESSURE: 155 MMHG | DIASTOLIC BLOOD PRESSURE: 67 MMHG

## 2021-02-09 VITALS — DIASTOLIC BLOOD PRESSURE: 67 MMHG

## 2021-02-09 VITALS — DIASTOLIC BLOOD PRESSURE: 64 MMHG

## 2021-02-09 VITALS — DIASTOLIC BLOOD PRESSURE: 62 MMHG | SYSTOLIC BLOOD PRESSURE: 155 MMHG

## 2021-02-09 VITALS — DIASTOLIC BLOOD PRESSURE: 80 MMHG

## 2021-02-10 VITALS — DIASTOLIC BLOOD PRESSURE: 94 MMHG | SYSTOLIC BLOOD PRESSURE: 155 MMHG

## 2021-02-10 VITALS — DIASTOLIC BLOOD PRESSURE: 70 MMHG

## 2021-02-10 LAB
BASOPHILS # BLD AUTO: 0 10*3/UL (ref 0–0.1)
BASOPHILS NFR BLD AUTO: 0.5 % (ref 0–1)
BUN SERPL-MCNC: 17 MG/DL (ref 7–24)
CHLORIDE SERPL-SCNC: 102 MMOL/L (ref 98–107)
CREAT SERPL-MCNC: 0.76 MG/DL (ref 0.55–1.02)
EOSINOPHIL # BLD AUTO: 0.2 10*3/UL (ref 0–0.4)
EOSINOPHIL # BLD AUTO: 5.1 % (ref 1–4)
ERYTHROCYTE [DISTWIDTH] IN BLOOD BY AUTOMATED COUNT: 12.4 % (ref 0–14.5)
HCT VFR BLD AUTO: 36.4 % (ref 37–47)
LYMPHOCYTES # BLD AUTO: 1.9 10*3/UL (ref 1.3–4.4)
LYMPHOCYTES NFR BLD AUTO: 48.8 % (ref 27–41)
MCH RBC QN AUTO: 30.1 PG (ref 27–31)
MCHC RBC AUTO-ENTMCNC: 31.9 G/DL (ref 33–37)
MCV RBC AUTO: 94.5 FL (ref 81–99)
MONOCYTES # BLD AUTO: 0.6 10*3/UL (ref 0.1–1)
MONOCYTES NFR BLD MANUAL: 16.4 % (ref 3–9)
NEUT #: 1.1 10*3/UL (ref 2.3–7.9)
NEUT %: 28.4 % (ref 47–73)
NRBC BLD QL AUTO: 0 10*3/UL (ref 0–0)
PLATELET # BLD AUTO: 157 10*3/UL (ref 130–400)
PMV BLD AUTO: 10.3 FL (ref 9.6–12.3)
POTASSIUM SERPL-SCNC: 4 MMOL/L (ref 3.5–5.1)
RBC # BLD AUTO: 3.85 10*6/UL (ref 4.1–5.1)
SODIUM SERPL-SCNC: 136 MMOL/L (ref 136–145)
WBC NRBC COR # BLD AUTO: 3.9 10*3/UL (ref 4.8–10.8)

## 2021-05-01 ENCOUNTER — HOSPITAL ENCOUNTER (EMERGENCY)
Dept: HOSPITAL 83 - ED | Age: 76
Discharge: NURSING, REHAB OR LONG-TERM CARE | End: 2021-05-01
Payer: COMMERCIAL

## 2021-05-01 VITALS — DIASTOLIC BLOOD PRESSURE: 69 MMHG | SYSTOLIC BLOOD PRESSURE: 114 MMHG

## 2021-05-01 VITALS — BODY MASS INDEX: 51.34 KG/M2 | WEIGHT: 238 LBS | HEIGHT: 56.97 IN

## 2021-05-01 DIAGNOSIS — Z91.041: ICD-10-CM

## 2021-05-01 DIAGNOSIS — Z79.899: ICD-10-CM

## 2021-05-01 DIAGNOSIS — Z88.0: ICD-10-CM

## 2021-05-01 DIAGNOSIS — M79.605: Primary | ICD-10-CM

## 2021-05-01 DIAGNOSIS — Z98.890: ICD-10-CM

## 2021-05-01 DIAGNOSIS — Z88.2: ICD-10-CM

## 2021-05-01 DIAGNOSIS — Z90.89: ICD-10-CM

## 2021-06-04 ENCOUNTER — HOSPITAL ENCOUNTER (OUTPATIENT)
Dept: HOSPITAL 83 - NM | Age: 76
Discharge: HOME | End: 2021-06-04
Attending: ORTHOPAEDIC SURGERY
Payer: COMMERCIAL

## 2021-06-04 DIAGNOSIS — M47.9: ICD-10-CM

## 2021-06-04 DIAGNOSIS — M25.552: ICD-10-CM

## 2021-06-04 DIAGNOSIS — M19.011: ICD-10-CM

## 2021-06-04 DIAGNOSIS — Z96.642: ICD-10-CM

## 2021-06-04 DIAGNOSIS — Z96.653: ICD-10-CM

## 2021-06-04 DIAGNOSIS — M79.605: ICD-10-CM

## 2021-06-04 DIAGNOSIS — T84.9XXA: ICD-10-CM

## 2021-06-04 DIAGNOSIS — M19.012: Primary | ICD-10-CM

## 2021-07-04 ENCOUNTER — HOSPITAL ENCOUNTER (INPATIENT)
Dept: HOSPITAL 83 - ED | Age: 76
LOS: 7 days | Discharge: NURSING, REHAB OR LONG-TERM CARE | DRG: 193 | End: 2021-07-11
Attending: INTERNAL MEDICINE | Admitting: INTERNAL MEDICINE
Payer: COMMERCIAL

## 2021-07-04 VITALS — SYSTOLIC BLOOD PRESSURE: 108 MMHG | DIASTOLIC BLOOD PRESSURE: 54 MMHG

## 2021-07-04 VITALS — SYSTOLIC BLOOD PRESSURE: 96 MMHG | DIASTOLIC BLOOD PRESSURE: 40 MMHG

## 2021-07-04 VITALS — DIASTOLIC BLOOD PRESSURE: 43 MMHG | SYSTOLIC BLOOD PRESSURE: 113 MMHG

## 2021-07-04 VITALS — WEIGHT: 221.13 LBS | BODY MASS INDEX: 47.71 KG/M2 | HEIGHT: 57 IN

## 2021-07-04 VITALS — DIASTOLIC BLOOD PRESSURE: 46 MMHG

## 2021-07-04 DIAGNOSIS — J96.21: ICD-10-CM

## 2021-07-04 DIAGNOSIS — Z96.653: ICD-10-CM

## 2021-07-04 DIAGNOSIS — Z20.822: ICD-10-CM

## 2021-07-04 DIAGNOSIS — E66.2: ICD-10-CM
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
## 2021-07-04 DIAGNOSIS — Z90.49: ICD-10-CM

## 2021-07-04 DIAGNOSIS — E11.22: ICD-10-CM

## 2021-07-04 DIAGNOSIS — Z83.3: ICD-10-CM

## 2021-07-04 DIAGNOSIS — Z88.7: ICD-10-CM

## 2021-07-04 DIAGNOSIS — R62.7: ICD-10-CM

## 2021-07-04 DIAGNOSIS — Z88.0: ICD-10-CM

## 2021-07-04 DIAGNOSIS — N18.31: ICD-10-CM

## 2021-07-04 DIAGNOSIS — J20.9: ICD-10-CM
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
## 2021-07-04 DIAGNOSIS — Z16.12: ICD-10-CM

## 2021-07-04 DIAGNOSIS — J18.9: Primary | ICD-10-CM

## 2021-07-04 DIAGNOSIS — Z88.8: ICD-10-CM

## 2021-07-04 DIAGNOSIS — J43.2: ICD-10-CM

## 2021-07-04 DIAGNOSIS — Z88.1: ICD-10-CM

## 2021-07-04 DIAGNOSIS — M1A.9XX0: ICD-10-CM
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
## 2021-07-04 DIAGNOSIS — F41.1: ICD-10-CM
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
## 2021-07-04 DIAGNOSIS — Z91.041: ICD-10-CM

## 2021-07-04 DIAGNOSIS — R19.7: ICD-10-CM

## 2021-07-04 DIAGNOSIS — B96.20: ICD-10-CM

## 2021-07-04 DIAGNOSIS — F31.9: ICD-10-CM

## 2021-07-04 DIAGNOSIS — T17.590A: ICD-10-CM

## 2021-07-04 DIAGNOSIS — Z90.710: ICD-10-CM

## 2021-07-04 LAB
ALBUMIN SERPL-MCNC: 2.4 GM/DL (ref 3.1–4.5)
ALP SERPL-CCNC: 55 U/L (ref 45–117)
ALT SERPL W P-5'-P-CCNC: 24 U/L (ref 12–78)
AST SERPL-CCNC: 36 IU/L (ref 3–35)
BUN SERPL-MCNC: 39 MG/DL (ref 7–24)
CHLORIDE SERPL-SCNC: 99 MMOL/L (ref 98–107)
CREAT SERPL-MCNC: 1.42 MG/DL (ref 0.55–1.02)
ERYTHROCYTE [DISTWIDTH] IN BLOOD BY AUTOMATED COUNT: 13.2 % (ref 0–14.5)
HCT VFR BLD AUTO: 39.7 % (ref 37–47)
LIPASE SERPL-CCNC: 102 U/L (ref 73–393)
MCH RBC QN AUTO: 30.8 PG (ref 27–31)
MCHC RBC AUTO-ENTMCNC: 31.2 G/DL (ref 33–37)
MCV RBC AUTO: 98.8 FL (ref 81–99)
NRBC BLD QL AUTO: 0 10*3/UL (ref 0–0)
PLATELET # BLD AUTO: 155 10*3/UL (ref 130–400)
PLATELET SUFFICIENCY: NORMAL
PMV BLD AUTO: 10 FL (ref 9.6–12.3)
POTASSIUM SERPL-SCNC: 3.6 MMOL/L (ref 3.5–5.1)
PROT SERPL-MCNC: 6.3 GM/DL (ref 6.4–8.2)
RBC # BLD AUTO: 4.02 10*6/UL (ref 4.1–5.1)
RBC MORPH BLD: NORMAL
SODIUM SERPL-SCNC: 131 MMOL/L (ref 136–145)
TOTAL CELLS COUNTED: 100 #CELLS
TROPONIN I SERPL-MCNC: 0.02 NG/ML (ref ?–0.04)
WBC NRBC COR # BLD AUTO: 13.7 10*3/UL (ref 4.8–10.8)

## 2021-07-05 VITALS — DIASTOLIC BLOOD PRESSURE: 65 MMHG

## 2021-07-05 VITALS — DIASTOLIC BLOOD PRESSURE: 54 MMHG

## 2021-07-05 VITALS — DIASTOLIC BLOOD PRESSURE: 41 MMHG

## 2021-07-05 VITALS — SYSTOLIC BLOOD PRESSURE: 104 MMHG | DIASTOLIC BLOOD PRESSURE: 38 MMHG

## 2021-07-05 VITALS — DIASTOLIC BLOOD PRESSURE: 38 MMHG

## 2021-07-05 LAB
BUN SERPL-MCNC: 43 MG/DL (ref 7–24)
CHLORIDE SERPL-SCNC: 97 MMOL/L (ref 98–107)
CREAT SERPL-MCNC: 1.52 MG/DL (ref 0.55–1.02)
ERYTHROCYTE [DISTWIDTH] IN BLOOD BY AUTOMATED COUNT: 13.2 % (ref 0–14.5)
HCT VFR BLD AUTO: 38.5 % (ref 37–47)
MCH RBC QN AUTO: 30.8 PG (ref 27–31)
MCHC RBC AUTO-ENTMCNC: 30.6 G/DL (ref 33–37)
MCV RBC AUTO: 100.5 FL (ref 81–99)
NRBC BLD QL AUTO: 0 % (ref 0–0)
PLATELET # BLD AUTO: 167 10*3/UL (ref 130–400)
PLATELET SUFFICIENCY: NORMAL
PMV BLD AUTO: 10.2 FL (ref 9.6–12.3)
POTASSIUM SERPL-SCNC: 4.1 MMOL/L (ref 3.5–5.1)
RBC # BLD AUTO: 3.83 10*6/UL (ref 4.1–5.1)
RBC MORPH BLD: NORMAL
SODIUM SERPL-SCNC: 134 MMOL/L (ref 136–145)
TOTAL CELLS COUNTED: 100 #CELLS
WBC NRBC COR # BLD AUTO: 11.9 10*3/UL (ref 4.8–10.8)

## 2021-07-06 VITALS — DIASTOLIC BLOOD PRESSURE: 90 MMHG

## 2021-07-06 VITALS — DIASTOLIC BLOOD PRESSURE: 41 MMHG

## 2021-07-06 VITALS — DIASTOLIC BLOOD PRESSURE: 42 MMHG

## 2021-07-06 VITALS — DIASTOLIC BLOOD PRESSURE: 52 MMHG

## 2021-07-06 VITALS — SYSTOLIC BLOOD PRESSURE: 102 MMHG | DIASTOLIC BLOOD PRESSURE: 60 MMHG

## 2021-07-06 LAB
BASE EXCESS BLDA CALC-SCNC: 3.2 MMOL/L (ref -2–2)
BASOPHILS # BLD AUTO: 0 10*3/UL (ref 0–0.1)
BASOPHILS NFR BLD AUTO: 0.3 % (ref 0–1)
BUN SERPL-MCNC: 49 MG/DL (ref 7–24)
CHLORIDE SERPL-SCNC: 99 MMOL/L (ref 98–107)
CREAT SERPL-MCNC: 1.45 MG/DL (ref 0.55–1.02)
EOSINOPHIL # BLD AUTO: 0 10*3/UL (ref 0–0.4)
EOSINOPHIL # BLD AUTO: 0.1 % (ref 1–4)
ERYTHROCYTE [DISTWIDTH] IN BLOOD BY AUTOMATED COUNT: 12.9 % (ref 0–14.5)
HCT VFR BLD AUTO: 38.4 % (ref 37–47)
LYMPHOCYTES # BLD AUTO: 1.5 10*3/UL (ref 1.3–4.4)
LYMPHOCYTES NFR BLD AUTO: 16.8 % (ref 27–41)
MCH RBC QN AUTO: 30.9 PG (ref 27–31)
MCHC RBC AUTO-ENTMCNC: 31.5 G/DL (ref 33–37)
MCV RBC AUTO: 98 FL (ref 81–99)
MONOCYTES # BLD AUTO: 1.1 10*3/UL (ref 0.1–1)
MONOCYTES NFR BLD MANUAL: 12.7 % (ref 3–9)
NEUT #: 6.2 10*3/UL (ref 2.3–7.9)
NEUT %: 69.1 % (ref 47–73)
NRBC BLD QL AUTO: 0 10*3/UL (ref 0–0)
PCO2 BLDA: 49 MMHG (ref 35–45)
PH BLDA: 7.39 [PH] (ref 7.35–7.45)
PLATELET # BLD AUTO: 182 10*3/UL (ref 130–400)
PMV BLD AUTO: 10.2 FL (ref 9.6–12.3)
PO2 BLDA: 75.7 MM[HG] (ref 80–90)
POTASSIUM SERPL-SCNC: 4 MMOL/L (ref 3.5–5.1)
RBC # BLD AUTO: 3.92 10*6/UL (ref 4.1–5.1)
SAO2 % BLDA: 95 % (ref 95–97)
SODIUM SERPL-SCNC: 134 MMOL/L (ref 136–145)
WBC NRBC COR # BLD AUTO: 9 10*3/UL (ref 4.8–10.8)

## 2021-07-07 VITALS — DIASTOLIC BLOOD PRESSURE: 52 MMHG

## 2021-07-07 VITALS — DIASTOLIC BLOOD PRESSURE: 67 MMHG | SYSTOLIC BLOOD PRESSURE: 150 MMHG

## 2021-07-07 VITALS — DIASTOLIC BLOOD PRESSURE: 64 MMHG

## 2021-07-07 VITALS — DIASTOLIC BLOOD PRESSURE: 62 MMHG

## 2021-07-07 VITALS — DIASTOLIC BLOOD PRESSURE: 67 MMHG | SYSTOLIC BLOOD PRESSURE: 145 MMHG

## 2021-07-07 LAB
ALBUMIN SERPL-MCNC: 1.9 GM/DL (ref 3.1–4.5)
ALP SERPL-CCNC: 42 U/L (ref 45–117)
ALT SERPL W P-5'-P-CCNC: 18 U/L (ref 12–78)
AST SERPL-CCNC: 21 IU/L (ref 3–35)
BACTERIA #/AREA URNS HPF: (no result) /[HPF]
BASOPHILS # BLD AUTO: 0 10*3/UL (ref 0–0.1)
BASOPHILS NFR BLD AUTO: 0.3 % (ref 0–1)
BUN SERPL-MCNC: 48 MG/DL (ref 7–24)
CHLORIDE SERPL-SCNC: 100 MMOL/L (ref 98–107)
CREAT SERPL-MCNC: 1.31 MG/DL (ref 0.55–1.02)
EOSINOPHIL # BLD AUTO: 0 % (ref 1–4)
EOSINOPHIL # BLD AUTO: 0 10*3/UL (ref 0–0.4)
EPI CELLS #/AREA URNS HPF: (no result) /[HPF]
ERYTHROCYTE [DISTWIDTH] IN BLOOD BY AUTOMATED COUNT: 12.8 % (ref 0–14.5)
HCT VFR BLD AUTO: 34.8 % (ref 37–47)
LEUKOCYTE ESTERASE UR QL STRIP: (no result)
LYMPHOCYTES # BLD AUTO: 1.3 10*3/UL (ref 1.3–4.4)
LYMPHOCYTES NFR BLD AUTO: 21.1 % (ref 27–41)
MCH RBC QN AUTO: 31.4 PG (ref 27–31)
MCHC RBC AUTO-ENTMCNC: 31.9 G/DL (ref 33–37)
MCV RBC AUTO: 98.6 FL (ref 81–99)
MONOCYTES # BLD AUTO: 0.7 10*3/UL (ref 0.1–1)
MONOCYTES NFR BLD MANUAL: 11.3 % (ref 3–9)
NEUT #: 4.2 10*3/UL (ref 2.3–7.9)
NEUT %: 66 % (ref 47–73)
NRBC BLD QL AUTO: 0 % (ref 0–0)
PH UR STRIP: 5.5 [PH] (ref 4.5–8)
PLATELET # BLD AUTO: 192 10*3/UL (ref 130–400)
PMV BLD AUTO: 10.1 FL (ref 9.6–12.3)
POTASSIUM SERPL-SCNC: 4.2 MMOL/L (ref 3.5–5.1)
PROT SERPL-MCNC: 5.9 GM/DL (ref 6.4–8.2)
RBC # BLD AUTO: 3.53 10*6/UL (ref 4.1–5.1)
SODIUM SERPL-SCNC: 136 MMOL/L (ref 136–145)
SP GR UR: 1.01 (ref 1–1.03)
UROBILINOGEN UR STRIP-MCNC: 0.2 E.U./DL (ref 0–1)
WBC #/AREA URNS HPF: (no result) WBC/HPF (ref 0–5)
WBC NRBC COR # BLD AUTO: 6.3 10*3/UL (ref 4.8–10.8)

## 2021-07-08 VITALS — SYSTOLIC BLOOD PRESSURE: 143 MMHG | DIASTOLIC BLOOD PRESSURE: 68 MMHG

## 2021-07-08 VITALS — DIASTOLIC BLOOD PRESSURE: 84 MMHG | SYSTOLIC BLOOD PRESSURE: 163 MMHG

## 2021-07-08 VITALS — DIASTOLIC BLOOD PRESSURE: 58 MMHG

## 2021-07-08 VITALS — DIASTOLIC BLOOD PRESSURE: 59 MMHG

## 2021-07-08 LAB
ALBUMIN SERPL-MCNC: 2 GM/DL (ref 3.1–4.5)
ALP SERPL-CCNC: 40 U/L (ref 45–117)
ALT SERPL W P-5'-P-CCNC: 17 U/L (ref 12–78)
APTT PPP: 27.8 SECONDS (ref 20–32.1)
AST SERPL-CCNC: 16 IU/L (ref 3–35)
BUN SERPL-MCNC: 44 MG/DL (ref 7–24)
CHLORIDE SERPL-SCNC: 101 MMOL/L (ref 98–107)
CREAT SERPL-MCNC: 1.19 MG/DL (ref 0.55–1.02)
INR BLD: 1 (ref 2–3.5)
POTASSIUM SERPL-SCNC: 4.2 MMOL/L (ref 3.5–5.1)
PROT SERPL-MCNC: 6.2 GM/DL (ref 6.4–8.2)
SODIUM SERPL-SCNC: 138 MMOL/L (ref 136–145)

## 2021-07-09 VITALS — DIASTOLIC BLOOD PRESSURE: 84 MMHG | SYSTOLIC BLOOD PRESSURE: 129 MMHG

## 2021-07-09 VITALS — DIASTOLIC BLOOD PRESSURE: 58 MMHG | SYSTOLIC BLOOD PRESSURE: 126 MMHG

## 2021-07-09 VITALS — DIASTOLIC BLOOD PRESSURE: 70 MMHG

## 2021-07-09 VITALS — SYSTOLIC BLOOD PRESSURE: 110 MMHG | DIASTOLIC BLOOD PRESSURE: 44 MMHG

## 2021-07-09 VITALS — DIASTOLIC BLOOD PRESSURE: 66 MMHG

## 2021-07-09 VITALS — DIASTOLIC BLOOD PRESSURE: 79 MMHG

## 2021-07-09 LAB
ALBUMIN SERPL-MCNC: 1.9 GM/DL (ref 3.1–4.5)
ALP SERPL-CCNC: 43 U/L (ref 45–117)
ALT SERPL W P-5'-P-CCNC: 17 U/L (ref 12–78)
AST SERPL-CCNC: 18 IU/L (ref 3–35)
BUN SERPL-MCNC: 44 MG/DL (ref 7–24)
CHLORIDE SERPL-SCNC: 104 MMOL/L (ref 98–107)
CREAT SERPL-MCNC: 1.09 MG/DL (ref 0.55–1.02)
ERYTHROCYTE [DISTWIDTH] IN BLOOD BY AUTOMATED COUNT: 12.6 % (ref 0–14.5)
HCT VFR BLD AUTO: 40 % (ref 37–47)
MCH RBC QN AUTO: 30.6 PG (ref 27–31)
MCHC RBC AUTO-ENTMCNC: 31.3 G/DL (ref 33–37)
MCV RBC AUTO: 97.8 FL (ref 81–99)
NRBC BLD QL AUTO: 0 10*3/UL (ref 0–0)
PLATELET # BLD AUTO: 227 10*3/UL (ref 130–400)
PLATELET SUFFICIENCY: NORMAL
PMV BLD AUTO: 10.2 FL (ref 9.6–12.3)
POTASSIUM SERPL-SCNC: 3.7 MMOL/L (ref 3.5–5.1)
PROT SERPL-MCNC: 6 GM/DL (ref 6.4–8.2)
RBC # BLD AUTO: 4.09 10*6/UL (ref 4.1–5.1)
RBC MORPH BLD: NORMAL
SODIUM SERPL-SCNC: 138 MMOL/L (ref 136–145)
TOTAL CELLS COUNTED: 100 #CELLS
WBC NRBC COR # BLD AUTO: 11.3 10*3/UL (ref 4.8–10.8)

## 2021-07-09 PROCEDURE — 0BCB8ZZ EXTIRPATION OF MATTER FROM LEFT LOWER LOBE BRONCHUS, VIA NATURAL OR ARTIFICIAL OPENING ENDOSCOPIC: ICD-10-PCS | Performed by: INTERNAL MEDICINE

## 2021-07-09 PROCEDURE — 0BC88ZZ EXTIRPATION OF MATTER FROM LEFT UPPER LOBE BRONCHUS, VIA NATURAL OR ARTIFICIAL OPENING ENDOSCOPIC: ICD-10-PCS | Performed by: INTERNAL MEDICINE

## 2021-07-09 PROCEDURE — 0BC68ZZ EXTIRPATION OF MATTER FROM RIGHT LOWER LOBE BRONCHUS, VIA NATURAL OR ARTIFICIAL OPENING ENDOSCOPIC: ICD-10-PCS | Performed by: INTERNAL MEDICINE

## 2021-07-09 PROCEDURE — 0BC78ZZ EXTIRPATION OF MATTER FROM LEFT MAIN BRONCHUS, VIA NATURAL OR ARTIFICIAL OPENING ENDOSCOPIC: ICD-10-PCS | Performed by: INTERNAL MEDICINE

## 2021-07-09 PROCEDURE — 0BC38ZZ EXTIRPATION OF MATTER FROM RIGHT MAIN BRONCHUS, VIA NATURAL OR ARTIFICIAL OPENING ENDOSCOPIC: ICD-10-PCS | Performed by: INTERNAL MEDICINE

## 2021-07-09 PROCEDURE — 0BC28ZZ EXTIRPATION OF MATTER FROM CARINA, VIA NATURAL OR ARTIFICIAL OPENING ENDOSCOPIC: ICD-10-PCS | Performed by: INTERNAL MEDICINE

## 2021-07-09 PROCEDURE — 0BC58ZZ EXTIRPATION OF MATTER FROM RIGHT MIDDLE LOBE BRONCHUS, VIA NATURAL OR ARTIFICIAL OPENING ENDOSCOPIC: ICD-10-PCS | Performed by: INTERNAL MEDICINE

## 2021-07-09 PROCEDURE — 0BC98ZZ EXTIRPATION OF MATTER FROM LINGULA BRONCHUS, VIA NATURAL OR ARTIFICIAL OPENING ENDOSCOPIC: ICD-10-PCS | Performed by: INTERNAL MEDICINE

## 2021-07-09 PROCEDURE — 0BC48ZZ EXTIRPATION OF MATTER FROM RIGHT UPPER LOBE BRONCHUS, VIA NATURAL OR ARTIFICIAL OPENING ENDOSCOPIC: ICD-10-PCS | Performed by: INTERNAL MEDICINE

## 2021-07-10 VITALS — SYSTOLIC BLOOD PRESSURE: 116 MMHG | DIASTOLIC BLOOD PRESSURE: 59 MMHG

## 2021-07-10 VITALS — DIASTOLIC BLOOD PRESSURE: 49 MMHG

## 2021-07-10 VITALS — DIASTOLIC BLOOD PRESSURE: 63 MMHG

## 2021-07-10 VITALS — DIASTOLIC BLOOD PRESSURE: 67 MMHG

## 2021-07-10 LAB
BASOPHILS # BLD AUTO: 0 10*3/UL (ref 0–0.1)
BASOPHILS NFR BLD AUTO: 0.4 % (ref 0–1)
EOSINOPHIL # BLD AUTO: 0 10*3/UL (ref 0–0.4)
EOSINOPHIL # BLD AUTO: 0.1 % (ref 1–4)
ERYTHROCYTE [DISTWIDTH] IN BLOOD BY AUTOMATED COUNT: 12.8 % (ref 0–14.5)
HCT VFR BLD AUTO: 37.2 % (ref 37–47)
LYMPHOCYTES # BLD AUTO: 1.2 10*3/UL (ref 1.3–4.4)
LYMPHOCYTES NFR BLD AUTO: 18 % (ref 27–41)
MCH RBC QN AUTO: 31.1 PG (ref 27–31)
MCHC RBC AUTO-ENTMCNC: 31.2 G/DL (ref 33–37)
MCV RBC AUTO: 99.7 FL (ref 81–99)
MONOCYTES # BLD AUTO: 0.8 10*3/UL (ref 0.1–1)
MONOCYTES NFR BLD MANUAL: 11.1 % (ref 3–9)
NEUT #: 4.7 10*3/UL (ref 2.3–7.9)
NEUT %: 68.2 % (ref 47–73)
NRBC BLD QL AUTO: 0 10*3/UL (ref 0–0)
PLATELET # BLD AUTO: 176 10*3/UL (ref 130–400)
PMV BLD AUTO: 9.4 FL (ref 9.6–12.3)
RBC # BLD AUTO: 3.73 10*6/UL (ref 4.1–5.1)
SPECIMEN PREPARATION: (no result)
WBC NRBC COR # BLD AUTO: 6.9 10*3/UL (ref 4.8–10.8)

## 2021-07-11 VITALS — DIASTOLIC BLOOD PRESSURE: 57 MMHG

## 2021-07-11 VITALS — DIASTOLIC BLOOD PRESSURE: 43 MMHG

## 2021-07-11 LAB
BUN SERPL-MCNC: 42 MG/DL (ref 7–24)
CHLORIDE SERPL-SCNC: 108 MMOL/L (ref 98–107)
CREAT SERPL-MCNC: 1.18 MG/DL (ref 0.55–1.02)
ERYTHROCYTE [DISTWIDTH] IN BLOOD BY AUTOMATED COUNT: 12.8 % (ref 0–14.5)
HCT VFR BLD AUTO: 38.3 % (ref 37–47)
MACROCYTES BLD QL SMEAR: SLIGHT
MCH RBC QN AUTO: 30.6 PG (ref 27–31)
MCHC RBC AUTO-ENTMCNC: 30.8 G/DL (ref 33–37)
MCV RBC AUTO: 99.2 FL (ref 81–99)
NRBC BLD QL AUTO: 0 % (ref 0–0)
OVALOCYTES BLD QL SMEAR: (no result)
PLATELET # BLD AUTO: 200 10*3/UL (ref 130–400)
PLATELET SUFFICIENCY: NORMAL
PMV BLD AUTO: 9.4 FL (ref 9.6–12.3)
POLYCHROMASIA BLD QL SMEAR: SLIGHT
POTASSIUM SERPL-SCNC: 3.9 MMOL/L (ref 3.5–5.1)
RBC # BLD AUTO: 3.86 10*6/UL (ref 4.1–5.1)
SODIUM SERPL-SCNC: 138 MMOL/L (ref 136–145)
TOTAL CELLS COUNTED: 100 #CELLS
WBC NRBC COR # BLD AUTO: 6.7 10*3/UL (ref 4.8–10.8)

## 2021-09-29 ENCOUNTER — HOSPITAL ENCOUNTER (INPATIENT)
Dept: HOSPITAL 83 - ED | Age: 76
LOS: 4 days | Discharge: NURSING, REHAB OR LONG-TERM CARE | DRG: 193 | End: 2021-10-03
Attending: INTERNAL MEDICINE | Admitting: INTERNAL MEDICINE
Payer: COMMERCIAL

## 2021-09-29 VITALS — DIASTOLIC BLOOD PRESSURE: 57 MMHG

## 2021-09-29 VITALS — DIASTOLIC BLOOD PRESSURE: 66 MMHG | SYSTOLIC BLOOD PRESSURE: 105 MMHG

## 2021-09-29 VITALS — DIASTOLIC BLOOD PRESSURE: 48 MMHG

## 2021-09-29 VITALS — SYSTOLIC BLOOD PRESSURE: 135 MMHG | DIASTOLIC BLOOD PRESSURE: 54 MMHG

## 2021-09-29 VITALS — DIASTOLIC BLOOD PRESSURE: 55 MMHG

## 2021-09-29 VITALS — DIASTOLIC BLOOD PRESSURE: 66 MMHG | SYSTOLIC BLOOD PRESSURE: 114 MMHG

## 2021-09-29 VITALS — HEIGHT: 55 IN | WEIGHT: 247.44 LBS

## 2021-09-29 DIAGNOSIS — Z88.0: ICD-10-CM

## 2021-09-29 DIAGNOSIS — J18.9: Primary | ICD-10-CM

## 2021-09-29 DIAGNOSIS — I50.33: ICD-10-CM

## 2021-09-29 DIAGNOSIS — F31.9: ICD-10-CM

## 2021-09-29 DIAGNOSIS — J44.1: ICD-10-CM

## 2021-09-29 DIAGNOSIS — R62.7: ICD-10-CM

## 2021-09-29 DIAGNOSIS — Z88.8: ICD-10-CM

## 2021-09-29 DIAGNOSIS — B95.5: ICD-10-CM

## 2021-09-29 DIAGNOSIS — Z91.041: ICD-10-CM

## 2021-09-29 DIAGNOSIS — Z88.1: ICD-10-CM

## 2021-09-29 DIAGNOSIS — Z90.710: ICD-10-CM

## 2021-09-29 DIAGNOSIS — F41.1: ICD-10-CM

## 2021-09-29 DIAGNOSIS — Z20.822: ICD-10-CM

## 2021-09-29 DIAGNOSIS — E11.22: ICD-10-CM

## 2021-09-29 DIAGNOSIS — I13.0: ICD-10-CM

## 2021-09-29 DIAGNOSIS — R78.81: ICD-10-CM

## 2021-09-29 DIAGNOSIS — Z88.2: ICD-10-CM

## 2021-09-29 DIAGNOSIS — N18.31: ICD-10-CM

## 2021-09-29 LAB
ALBUMIN SERPL-MCNC: 2.2 GM/DL (ref 3.1–4.5)
ALP SERPL-CCNC: 52 U/L (ref 45–117)
ALT SERPL W P-5'-P-CCNC: 10 U/L (ref 12–78)
AST SERPL-CCNC: 7 IU/L (ref 3–35)
BUN SERPL-MCNC: 34 MG/DL (ref 7–24)
CHLORIDE SERPL-SCNC: 108 MMOL/L (ref 98–107)
CREAT SERPL-MCNC: 1.05 MG/DL (ref 0.55–1.02)
ERYTHROCYTE [DISTWIDTH] IN BLOOD BY AUTOMATED COUNT: 13.7 % (ref 0–14.5)
HCT VFR BLD AUTO: 36.9 % (ref 37–47)
MACROCYTES BLD QL SMEAR: SLIGHT
MCH RBC QN AUTO: 30.8 PG (ref 27–31)
MCHC RBC AUTO-ENTMCNC: 29.5 G/DL (ref 33–37)
MCV RBC AUTO: 104.2 FL (ref 81–99)
NRBC BLD QL AUTO: 0 % (ref 0–0)
PLATELET # BLD AUTO: 145 10*3/UL (ref 130–400)
PLATELET SUFFICIENCY: NORMAL
PMV BLD AUTO: 10 FL (ref 9.6–12.3)
POTASSIUM SERPL-SCNC: 5.5 MMOL/L (ref 3.5–5.1)
PROT SERPL-MCNC: 5.8 GM/DL (ref 6.4–8.2)
RBC # BLD AUTO: 3.54 10*6/UL (ref 4.1–5.1)
SODIUM SERPL-SCNC: 141 MMOL/L (ref 136–145)
TOTAL CELLS COUNTED: 100 #CELLS
TROPONIN I SERPL-MCNC: 0.02 NG/ML (ref ?–0.04)
WBC NRBC COR # BLD AUTO: 4.5 10*3/UL (ref 4.8–10.8)

## 2021-09-30 VITALS — SYSTOLIC BLOOD PRESSURE: 121 MMHG | DIASTOLIC BLOOD PRESSURE: 41 MMHG

## 2021-09-30 VITALS — DIASTOLIC BLOOD PRESSURE: 83 MMHG | SYSTOLIC BLOOD PRESSURE: 143 MMHG

## 2021-09-30 VITALS — DIASTOLIC BLOOD PRESSURE: 107 MMHG

## 2021-09-30 VITALS — DIASTOLIC BLOOD PRESSURE: 68 MMHG

## 2021-09-30 VITALS — SYSTOLIC BLOOD PRESSURE: 145 MMHG | DIASTOLIC BLOOD PRESSURE: 86 MMHG

## 2021-09-30 VITALS — DIASTOLIC BLOOD PRESSURE: 83 MMHG

## 2021-10-01 VITALS — SYSTOLIC BLOOD PRESSURE: 139 MMHG | DIASTOLIC BLOOD PRESSURE: 84 MMHG

## 2021-10-01 VITALS — SYSTOLIC BLOOD PRESSURE: 102 MMHG | DIASTOLIC BLOOD PRESSURE: 46 MMHG

## 2021-10-01 VITALS — SYSTOLIC BLOOD PRESSURE: 133 MMHG | DIASTOLIC BLOOD PRESSURE: 53 MMHG

## 2021-10-01 VITALS — DIASTOLIC BLOOD PRESSURE: 55 MMHG

## 2021-10-01 VITALS — DIASTOLIC BLOOD PRESSURE: 52 MMHG | SYSTOLIC BLOOD PRESSURE: 132 MMHG

## 2021-10-01 LAB
BASOPHILS # BLD AUTO: 0 10*3/UL (ref 0–0.1)
BASOPHILS NFR BLD AUTO: 0.7 % (ref 0–1)
BUN SERPL-MCNC: 33 MG/DL (ref 7–24)
CHLORIDE SERPL-SCNC: 110 MMOL/L (ref 98–107)
CREAT SERPL-MCNC: 0.98 MG/DL (ref 0.55–1.02)
EOSINOPHIL # BLD AUTO: 0 % (ref 1–4)
EOSINOPHIL # BLD AUTO: 0 10*3/UL (ref 0–0.4)
ERYTHROCYTE [DISTWIDTH] IN BLOOD BY AUTOMATED COUNT: 13.6 % (ref 0–14.5)
HCT VFR BLD AUTO: 33.7 % (ref 37–47)
LYMPHOCYTES # BLD AUTO: 1 10*3/UL (ref 1.3–4.4)
LYMPHOCYTES NFR BLD AUTO: 22.7 % (ref 27–41)
MCH RBC QN AUTO: 31 PG (ref 27–31)
MCHC RBC AUTO-ENTMCNC: 30.3 G/DL (ref 33–37)
MCV RBC AUTO: 102.4 FL (ref 81–99)
MONOCYTES # BLD AUTO: 0.5 10*3/UL (ref 0.1–1)
MONOCYTES NFR BLD MANUAL: 11.6 % (ref 3–9)
NEUT #: 2.9 10*3/UL (ref 2.3–7.9)
NEUT %: 63.9 % (ref 47–73)
NRBC BLD QL AUTO: 0 10*3/UL (ref 0–0)
PLATELET # BLD AUTO: 128 10*3/UL (ref 130–400)
PMV BLD AUTO: 11.1 FL (ref 9.6–12.3)
POTASSIUM SERPL-SCNC: 5.1 MMOL/L (ref 3.5–5.1)
RBC # BLD AUTO: 3.29 10*6/UL (ref 4.1–5.1)
SODIUM SERPL-SCNC: 139 MMOL/L (ref 136–145)
WBC NRBC COR # BLD AUTO: 4.5 10*3/UL (ref 4.8–10.8)

## 2021-10-02 VITALS — SYSTOLIC BLOOD PRESSURE: 141 MMHG | DIASTOLIC BLOOD PRESSURE: 84 MMHG

## 2021-10-02 VITALS — DIASTOLIC BLOOD PRESSURE: 86 MMHG | SYSTOLIC BLOOD PRESSURE: 171 MMHG

## 2021-10-02 VITALS — SYSTOLIC BLOOD PRESSURE: 158 MMHG | DIASTOLIC BLOOD PRESSURE: 93 MMHG

## 2021-10-02 VITALS — SYSTOLIC BLOOD PRESSURE: 149 MMHG | DIASTOLIC BLOOD PRESSURE: 71 MMHG

## 2021-10-02 VITALS — SYSTOLIC BLOOD PRESSURE: 150 MMHG | DIASTOLIC BLOOD PRESSURE: 93 MMHG

## 2021-10-02 LAB
BUN SERPL-MCNC: 26 MG/DL (ref 7–24)
CHLORIDE SERPL-SCNC: 108 MMOL/L (ref 98–107)
CREAT SERPL-MCNC: 0.83 MG/DL (ref 0.55–1.02)
ERYTHROCYTE [DISTWIDTH] IN BLOOD BY AUTOMATED COUNT: 13.8 % (ref 0–14.5)
HCT VFR BLD AUTO: 37.2 % (ref 37–47)
MACROCYTES BLD QL SMEAR: SLIGHT
MCH RBC QN AUTO: 30.2 PG (ref 27–31)
MCHC RBC AUTO-ENTMCNC: 30.4 G/DL (ref 33–37)
MCV RBC AUTO: 99.5 FL (ref 81–99)
NRBC BLD QL AUTO: 0 % (ref 0–0)
PLATELET # BLD AUTO: 142 10*3/UL (ref 130–400)
PLATELET SUFFICIENCY: NORMAL
PMV BLD AUTO: 10.4 FL (ref 9.6–12.3)
POLYCHROMASIA BLD QL SMEAR: SLIGHT
POTASSIUM SERPL-SCNC: 4.8 MMOL/L (ref 3.5–5.1)
RBC # BLD AUTO: 3.74 10*6/UL (ref 4.1–5.1)
SODIUM SERPL-SCNC: 141 MMOL/L (ref 136–145)
TOTAL CELLS COUNTED: 100 #CELLS
WBC NRBC COR # BLD AUTO: 5.3 10*3/UL (ref 4.8–10.8)

## 2021-10-03 VITALS — SYSTOLIC BLOOD PRESSURE: 146 MMHG | DIASTOLIC BLOOD PRESSURE: 59 MMHG

## 2021-10-03 VITALS — SYSTOLIC BLOOD PRESSURE: 157 MMHG | DIASTOLIC BLOOD PRESSURE: 77 MMHG

## 2021-10-03 VITALS — DIASTOLIC BLOOD PRESSURE: 52 MMHG

## 2021-10-08 ENCOUNTER — HOSPITAL ENCOUNTER (EMERGENCY)
Dept: HOSPITAL 83 - ED | Age: 76
Discharge: HOME | End: 2021-10-08
Payer: COMMERCIAL

## 2021-10-08 VITALS — HEIGHT: 55 IN

## 2021-10-08 VITALS — SYSTOLIC BLOOD PRESSURE: 140 MMHG | DIASTOLIC BLOOD PRESSURE: 68 MMHG

## 2021-10-08 DIAGNOSIS — B37.2: ICD-10-CM

## 2021-10-08 DIAGNOSIS — Z88.1: ICD-10-CM

## 2021-10-08 DIAGNOSIS — Z88.2: ICD-10-CM

## 2021-10-08 DIAGNOSIS — I11.0: ICD-10-CM

## 2021-10-08 DIAGNOSIS — Z88.0: ICD-10-CM

## 2021-10-08 DIAGNOSIS — Z79.899: ICD-10-CM

## 2021-10-08 DIAGNOSIS — N61.0: ICD-10-CM

## 2021-10-08 DIAGNOSIS — Z91.041: ICD-10-CM

## 2021-10-08 DIAGNOSIS — I50.9: ICD-10-CM

## 2021-10-08 DIAGNOSIS — J18.9: Primary | ICD-10-CM

## 2021-10-08 LAB
ALBUMIN SERPL-MCNC: 2.2 GM/DL (ref 3.1–4.5)
ALP SERPL-CCNC: 49 U/L (ref 45–117)
ALT SERPL W P-5'-P-CCNC: 11 U/L (ref 12–78)
AST SERPL-CCNC: 7 IU/L (ref 3–35)
BASOPHILS # BLD AUTO: 0 10*3/UL (ref 0–0.1)
BASOPHILS NFR BLD AUTO: 0.2 % (ref 0–1)
BUN SERPL-MCNC: 19 MG/DL (ref 7–24)
CHLORIDE SERPL-SCNC: 103 MMOL/L (ref 98–107)
CREAT SERPL-MCNC: 1.33 MG/DL (ref 0.55–1.02)
EOSINOPHIL # BLD AUTO: 0 10*3/UL (ref 0–0.4)
EOSINOPHIL # BLD AUTO: 0.2 % (ref 1–4)
ERYTHROCYTE [DISTWIDTH] IN BLOOD BY AUTOMATED COUNT: 13.8 % (ref 0–14.5)
HCT VFR BLD AUTO: 38 % (ref 37–47)
LYMPHOCYTES # BLD AUTO: 0.8 10*3/UL (ref 1.3–4.4)
LYMPHOCYTES NFR BLD AUTO: 16.8 % (ref 27–41)
MCH RBC QN AUTO: 30.7 PG (ref 27–31)
MCHC RBC AUTO-ENTMCNC: 28.9 G/DL (ref 33–37)
MCV RBC AUTO: 106.1 FL (ref 81–99)
MONOCYTES # BLD AUTO: 0.7 10*3/UL (ref 0.1–1)
MONOCYTES NFR BLD MANUAL: 15.7 % (ref 3–9)
NEUT #: 2.9 10*3/UL (ref 2.3–7.9)
NEUT %: 65.3 % (ref 47–73)
NRBC BLD QL AUTO: 0 10*3/UL (ref 0–0)
PLATELET # BLD AUTO: 125 10*3/UL (ref 130–400)
PMV BLD AUTO: 10.2 FL (ref 9.6–12.3)
POTASSIUM SERPL-SCNC: 3.7 MMOL/L (ref 3.5–5.1)
PROT SERPL-MCNC: 6.1 GM/DL (ref 6.4–8.2)
RBC # BLD AUTO: 3.58 10*6/UL (ref 4.1–5.1)
SODIUM SERPL-SCNC: 143 MMOL/L (ref 136–145)
TROPONIN I SERPL-MCNC: 0.03 NG/ML (ref ?–0.04)
WBC NRBC COR # BLD AUTO: 4.5 10*3/UL (ref 4.8–10.8)